# Patient Record
Sex: FEMALE | Race: WHITE
[De-identification: names, ages, dates, MRNs, and addresses within clinical notes are randomized per-mention and may not be internally consistent; named-entity substitution may affect disease eponyms.]

---

## 2022-01-09 ENCOUNTER — HOSPITAL ENCOUNTER (EMERGENCY)
Dept: HOSPITAL 75 - ER FS | Age: 32
Discharge: HOME | End: 2022-01-09
Payer: COMMERCIAL

## 2022-01-09 VITALS — SYSTOLIC BLOOD PRESSURE: 95 MMHG | DIASTOLIC BLOOD PRESSURE: 68 MMHG

## 2022-01-09 VITALS — BODY MASS INDEX: 41.15 KG/M2 | WEIGHT: 238.1 LBS | HEIGHT: 63.78 IN

## 2022-01-09 DIAGNOSIS — R10.13: Primary | ICD-10-CM

## 2022-01-09 DIAGNOSIS — E66.9: ICD-10-CM

## 2022-01-09 DIAGNOSIS — E86.0: ICD-10-CM

## 2022-01-09 DIAGNOSIS — R82.71: ICD-10-CM

## 2022-01-09 DIAGNOSIS — R19.7: ICD-10-CM

## 2022-01-09 LAB
ALBUMIN SERPL-MCNC: 4.2 GM/DL (ref 3.2–4.5)
ALP SERPL-CCNC: 92 U/L (ref 40–136)
ALT SERPL-CCNC: 8 U/L (ref 0–55)
APTT PPP: YELLOW S
BACTERIA #/AREA URNS HPF: (no result) /HPF
BASOPHILS # BLD AUTO: 0 10^3/UL (ref 0–0.1)
BASOPHILS NFR BLD AUTO: 0 % (ref 0–10)
BILIRUB SERPL-MCNC: 0.3 MG/DL (ref 0.1–1)
BILIRUB UR QL STRIP: NEGATIVE
BUN/CREAT SERPL: 18
CALCIUM SERPL-MCNC: 9.3 MG/DL (ref 8.5–10.1)
CHLORIDE SERPL-SCNC: 104 MMOL/L (ref 98–107)
CO2 SERPL-SCNC: 22 MMOL/L (ref 21–32)
CREAT SERPL-MCNC: 1.17 MG/DL (ref 0.6–1.3)
EOSINOPHIL # BLD AUTO: 0.2 10^3/UL (ref 0–0.3)
EOSINOPHIL NFR BLD AUTO: 1 % (ref 0–10)
EOSINOPHIL NFR BLD MANUAL: 3 %
FIBRINOGEN PPP-MCNC: (no result) MG/DL
GFR SERPLBLD BASED ON 1.73 SQ M-ARVRAT: 53 ML/MIN
GLUCOSE SERPL-MCNC: 228 MG/DL (ref 70–105)
GLUCOSE UR STRIP-MCNC: NEGATIVE MG/DL
GRAN CASTS #/AREA URNS LPF: (no result) /LPF
HCT VFR BLD CALC: 52 % (ref 35–52)
HGB BLD-MCNC: 17.7 G/DL (ref 11.5–16)
HYALINE CASTS #/AREA URNS LPF: (no result) /LPF
KETONES UR QL STRIP: NEGATIVE
LEUKOCYTE ESTERASE UR QL STRIP: NEGATIVE
LIPASE SERPL-CCNC: 25 U/L (ref 8–78)
LYMPHOCYTES # BLD AUTO: 3.5 X 10^3 (ref 1–4)
LYMPHOCYTES NFR BLD AUTO: 18 % (ref 12–44)
MANUAL DIFFERENTIAL PERFORMED BLD QL: YES
MCH RBC QN AUTO: 29 PG (ref 25–34)
MCHC RBC AUTO-ENTMCNC: 34 G/DL (ref 32–36)
MCV RBC AUTO: 85 FL (ref 80–99)
MONOCYTES # BLD AUTO: 0.6 X 10^3 (ref 0–1)
MONOCYTES NFR BLD AUTO: 3 % (ref 0–12)
MONOCYTES NFR BLD: 2 %
NEUTROPHILS # BLD AUTO: 15.3 X 10^3 (ref 1.8–7.8)
NEUTROPHILS NFR BLD AUTO: 78 % (ref 42–75)
NEUTS BAND NFR BLD MANUAL: 77 %
NITRITE UR QL STRIP: NEGATIVE
PH UR STRIP: 5.5 [PH] (ref 5–9)
PLATELET # BLD: 521 10^3/UL (ref 130–400)
PMV BLD AUTO: 8.8 FL (ref 9–12.2)
POTASSIUM SERPL-SCNC: 3.8 MMOL/L (ref 3.6–5)
PROT SERPL-MCNC: 7 GM/DL (ref 6.4–8.2)
PROT UR QL STRIP: (no result)
RBC #/AREA URNS HPF: >100 /HPF
SODIUM SERPL-SCNC: 142 MMOL/L (ref 135–145)
SP GR UR STRIP: >=1.03 (ref 1.02–1.02)
SQUAMOUS #/AREA URNS HPF: (no result) /HPF
TOXIC GRANULES BLD QL SMEAR: (no result)
VARIANT LYMPHS NFR BLD MANUAL: 18 %
WBC # BLD AUTO: 19.7 10^3/UL (ref 4.3–11)
WBC #/AREA URNS HPF: (no result) /HPF

## 2022-01-09 PROCEDURE — 80053 COMPREHEN METABOLIC PANEL: CPT

## 2022-01-09 PROCEDURE — 36415 COLL VENOUS BLD VENIPUNCTURE: CPT

## 2022-01-09 PROCEDURE — 87088 URINE BACTERIA CULTURE: CPT

## 2022-01-09 PROCEDURE — 85027 COMPLETE CBC AUTOMATED: CPT

## 2022-01-09 PROCEDURE — 74176 CT ABD & PELVIS W/O CONTRAST: CPT

## 2022-01-09 PROCEDURE — 81000 URINALYSIS NONAUTO W/SCOPE: CPT

## 2022-01-09 PROCEDURE — 84703 CHORIONIC GONADOTROPIN ASSAY: CPT

## 2022-01-09 PROCEDURE — 83690 ASSAY OF LIPASE: CPT

## 2022-01-09 PROCEDURE — 85007 BL SMEAR W/DIFF WBC COUNT: CPT

## 2022-01-09 NOTE — XMS REPORT
Encounter Summary

                             Created on: 2022



Vane Daniel

External Reference #: [id number]

: 1990

Sex: Female



Demographics





                          Address                   [address] [place] [address]  [address]

 

                          Home Phone                [phone]

 

                          Preferred Language        English

 

                          Marital Status            

 

                          Anabaptist Affiliation     [id number]

 

                          Race                      White

 

                          Ethnic Group              Not  or 





Author





                          Author                    [facility]

 

                          Organization              [facility]

 

                          Address                   Unknown

 

                          Phone                     Unavailable







Support





                Name            Relationship    Address         Phone

 

                    Soham Daniel     ECON                [address] [place] [address]  [address]                      [phone]







Care Team Providers





                    Care Team Member Name Role                Phone

 

                    Tito Junior MD    PCP                 [phone]







Reason for Visit

* 



  



                     Reason              Onset Date          Comments

 

  



                           Medication Refill         2021 









Encounter Details





                          Care Team                 Description



                     Date                Type                Department  

 

                                        



Jahaira Canas, LORI



[address]



[place] [address]



[phone] (Work)



[phone] (Fax)                      Medication Refill



                     2021          Refill              [facility]

c  



                                         Specialists  



                                         [address] [facility]  



                                         [address]  



                                         [place] [address] [phone]  







Social History





                                        Date



                 Tobacco Use     Types           Packs/Day       Years Used 

 

                                         



                                         Never Smoker    

 

    



                                         Smokeless Tobacco: Never   



                                         Used   







                                        Comments



                           Alcohol Use               Standard Drinks/Week 

 

                                        social



                           Yes                       0 (1 standard drink = 0.6 o

z pure alcohol) 







  



                     Alcohol Habits      Answer              Date Recorded

 

  



                           How often do you have a drink containing alcohol?  No

t asked 

 

  



                           How many drinks containing alcohol do you have on  No

t asked 



                                         a typical day when you are drinking?  

 

  



                           How often do you have six or more drinks on one  Not 

asked 



                                         occasion?  

 

  



                     Comment:            social              2020







 



                           Sex Assigned at Birth     Date Recorded

 

 



                                         Not on file 



documented as of this encounter



Plan of Treatment





                          Care Team                 Description



                     Date                Type                Specialty  

 

                                        



Salazar Leos MD



[address]



[address]



[place] [address] [phone] (Work)



[phone] (Fax)                       



                     2022          Office Visit        Orthopedic Surgery 

 



documented as of this encounter



Visit Diagnoses









                                         Diagnosis

 





                                         Post-op pain - Primary



                                         Other acute postoperative pain



documented in this encounter



Care Teams





                          Start Date                End Date



                     Team Member         Relationship        Specialty  

 

                          10/24/17                   



                     Tito Junior MD   PCP - General       Family  



                           [phone] (Work)       Medicine  



                                         [phone] (Fax)    



documented as of this encounter

## 2022-01-09 NOTE — DIAGNOSTIC IMAGING REPORT
PROCEDURE: CT abdomen and pelvis without contrast.



TECHNIQUE: Multiple contiguous axial images were obtained through

the abdomen and pelvis without the use of intravenous contrast.

Auto Exposure Controls were utilized during the CT exam to meet

ALARA standards for radiation dose reduction. 



INDICATION:  Abdominal pain. Nausea.



COMPARISON:  None.

 

FINDINGS:  

The heart is unremarkable. The lung bases are clear.



There is atrophy of the left kidney. The right kidney is

unremarkable without evidence of hydronephrosis or renal calculi.

No perinephric fat stranding is seen. The urinary bladder is

decompressed.



The liver, spleen, pancreas, and adrenal glands have a normal

appearance. The gallbladder is unremarkable. There is no

pathologically enlarged mesenteric or retroperitoneal adenopathy.





The bowel loops are nondilated. The appendix is visualized in the

right lower quadrant and has a normal appearance. There is no

free fluid or free air. 



No acute osseous abnormalities.



There is no free air, loculated collection, or adenopathy in the

pelvis. 



IMPRESSION: 

1. No acute abnormalities in the abdomen and pelvis.

2. Chronic atrophy of the left kidney. The right kidney has a

normal appearance.



Dictated by: 



  Dictated on workstation # [id number]

## 2022-01-09 NOTE — XMS REPORT
Encounter Summary

                             Created on: 2022



Vane Daniel

External Reference #: [id number]

: 1990

Sex: Female



Demographics





                          Address                   [address] [place]  [address]

 

                          Home Phone                [phone]

 

                          Preferred Language        English

 

                          Marital Status            

 

                          Hinduism Affiliation     [id number]

 

                          Race                      White

 

                          Ethnic Group              Not  or 





Author





                          Author                    [facility]

 

                          Organization              [facility]

 

                          Address                   Unknown

 

                          Phone                     Unavailable







Support





                Name            Relationship    Address         Phone

 

                    Soham Daniel     ECON                [address] [place]  [address]                      [phone]







Care Team Providers





                    Care Team Member Name Role                Phone

 

                    Tito Junior MD    PCP                 [phone]







Encounter Details





                          Care Team                 Description



                     Date                Type                Department  

 

                                        



Salazar Leos MD



[address]



[address]



[place] [address] [phone] (Work)



[phone] (Fax)                       



                     2021          Telephone           [facility]

c  



                                         Specialists  



                                         [address] [facility]  



                                         [address]  



                                         [place] [address] [phone]  







Social History





                                        Date



                 Tobacco Use     Types           Packs/Day       Years Used 

 

                                         



                                         Never Smoker    

 

    



                                         Smokeless Tobacco: Never   



                                         Used   







                                        Comments



                           Alcohol Use               Standard Drinks/Week 

 

                                        social



                           Yes                       0 (1 standard drink = 0.6 o

z pure alcohol) 







  



                     Alcohol Habits      Answer              Date Recorded

 

  



                           How often do you have a drink containing alcohol?  No

t asked 

 

  



                           How many drinks containing alcohol do you have on  No

t asked 



                                         a typical day when you are drinking?  

 

  



                           How often do you have six or more drinks on one  Not 

asked 



                                         occasion?  

 

  



                     Comment:            social              2020







 



                           Sex Assigned at Birth     Date Recorded

 

 



                                         Not on file 



documented as of this encounter



Miscellaneous Notes

* Telephone Encounter - Yaw Gaitan, AT - 2021 11:18 AM CST



Formatting of this note might be different from the original.

I called and spoke with the patient about her post-op splint. She stated that as
her swelling has decreased her splint has become loose and is rubbing over her 
incisions. I recommended that she put ice over the areas of discomfort to reduce
immediate pain, and to tighten the ace wraps around the splint to see if that d
ecreases any friction around her incisions. I explained that she should not therese
ve any of the cotton wrap, or the plaster part of the splint. She was understand
ing of this and appreciated the return call. 



Electronically signed by Yaw Gaitan, SANTIAGO at 2021 11:21 AM CST

documented in this encounter



Plan of Treatment





                          Care Team                 Description



                     Date                Type                Specialty  

 

                                        



Salazar Leos MD



[address]



[place] [address]



[phone] (Work)



[phone] (Fax)                       



                     2022          Office Visit        Orthopedic Surgery 

 



documented as of this encounter



Visit Diagnoses

Not on filedocumented in this encounter



Care Teams





                          Start Date                End Date



                     Team Member         Relationship        Specialty  

 

                          10/24/17                   



                     Tito Junior MD   PCP - General       Family  



                           [phone] (Work)       Medicine  



                                         [phone] (Fax)    



documented as of this encounter

## 2022-01-09 NOTE — XMS REPORT
Encounter Summary

                             Created on: 2022



Vane Daniel

External Reference #: [id number]

: 1990

Sex: Female



Demographics





                          Address                   [address] [place] [address]  [address]

 

                          Home Phone                [phone]

 

                          Preferred Language        English

 

                          Marital Status            

 

                          Taoist Affiliation     [id number]

 

                          Race                      White

 

                          Ethnic Group              Not  or 





Author





                          Author                    [facility]

 

                          Organization              [facility]

 

                          Address                   Unknown

 

                          Phone                     Unavailable







Support





                Name            Relationship    Address         Phone

 

                    Soham Daniel     ECON                [address] [place] [address]  [address]                      [phone]







Care Team Providers





                    Care Team Member Name Role                Phone

 

                    Tito Junior MD    PCP                 [phone]







Reason for Visit

* Surgical (Routine) - Closed



                    Diagnoses / Procedures Referred By Contact Referred To Conta

ct



                                         Specialty   

 

                                        



Diagnoses



Osteoarthritis of right ankle and foot





Procedures



Referral to Outside Surgery



FL ANKLE SCOPE,PART SYNOVECTOMY



FL ANKLE SCOPE,PART DEBRIDEMENT



FL HALLUX RIGIDUS W/CHEILECTOMY 1ST MP JT W/O IMPLT



FL BONE BIOPSY,TROCAR/NEEDLE SUPERF



FL REMV TALUS/HEEL BENIGN BONE LESN



FL GASTROCNEMIUS RECESSION              



Salazar Leos MD



[address]



[address]



[place] [address]



Phone: [phone]



Fax: [phone]                       



[facility] [facility]



[address] [place] [address]



Fax: [phone]







      



           Referral ID  Status    Reason    Start Date  Expiration  Visits    Vi

sits



                     Date                Requested           Authorized

 

      



            [id number]    Closed     10/26/2021  2022  1          1











Encounter Details





                          Care Team                 Description



                     Date                Type                Department  

 

                                        



Salazar Leos MD



[address]



[address]



[place] [address] [phone] (Work)



[phone] (Fax)                       



                     2021          Outside Surgery     [facility]

c  



                                         Specialists  



                                         [address] [facility]  



                                         [address]  



                                         [place] [address] [phone]  







Social History





                                        Date



                 Tobacco Use     Types           Packs/Day       Years Used 

 

                                         



                                         Never Smoker    

 

    



                                         Smokeless Tobacco: Never   



                                         Used   







                                        Comments



                           Alcohol Use               Standard Drinks/Week 

 

                                        social



                           Yes                       0 (1 standard drink = 0.6 o

z pure alcohol) 







  



                     Alcohol Habits      Answer              Date Recorded

 

  



                           How often do you have a drink containing alcohol?  No

t asked 

 

  



                           How many drinks containing alcohol do you have on  No

t asked 



                                         a typical day when you are drinking?  

 

  



                           How often do you have six or more drinks on one  Not 

asked 



                                         occasion?  

 

  



                     Comment:            social              2020







 



                           Sex Assigned at Birth     Date Recorded

 

 



                                         Not on file 



documented as of this encounter



Plan of Treatment





                          Care Team                 Description



                     Date                Type                Specialty  

 

                                        



Salazar Leos MD



[address]



[place] [address]



[phone] (Work)



[phone] (Fax)                       



                     2022          Office Visit        Orthopedic Surgery 

 



documented as of this encounter



Visit Diagnoses

Not on filedocumented in this encounter



Care Teams





                          Start Date                End Date



                     Team Member         Relationship        Specialty  

 

                          10/24/17                   



                     Tito Junior MD   PCP - General       Family  



                           [phone] (Work)       Medicine  



                                         [phone] (Fax)    



documented as of this encounter

## 2022-01-09 NOTE — XMS REPORT
Encounter Summary

                             Created on: 2022



Vane Daniel

External Reference #: [id number]

: 1990

Sex: Female



Demographics





                          Address                   [address] [place]  [address]

 

                          Home Phone                [phone]

 

                          Preferred Language        English

 

                          Marital Status            

 

                          Yazdanism Affiliation     [id number]

 

                          Race                      White

 

                          Ethnic Group              Not  or 





Author





                          Author                    [facility]

 

                          Organization              [facility]

 

                          Address                   Unknown

 

                          Phone                     Unavailable







Support





                Name            Relationship    Address         Phone

 

                    Soham Daniel     ECON                [address] [place] [address]  [address]                      [phone]







Care Team Providers





                    Care Team Member Name Role                Phone

 

                    Tito Junior MD    PCP                 [phone]







Reason for Visit

* 



  



                     Reason              Onset Date          Comments

 

  



                           Medication Refill         2022 









Encounter Details





                          Care Team                 Description



                     Date                Type                Department  

 

                                        



Jen King PA-C



[address] [facility] [facility]



[address]



[place] [address] [phone] (Work)



[phone] (Fax)                      Medication Refill



                     2022          Refill              [facility]  



                                         Specialists  



                                         [address] [facility]  



                                         [address]  



                                         [place] [address] [phone]  







Social History





                                        Date



                 Tobacco Use     Types           Packs/Day       Years Used 

 

                                         



                                         Never Smoker    

 

    



                                         Smokeless Tobacco: Never   



                                         Used   







                                        Comments



                           Alcohol Use               Standard Drinks/Week 

 

                                        social



                           Yes                       0 (1 standard drink = 0.6 o

z pure alcohol) 







  



                     Alcohol Habits      Answer              Date Recorded

 

  



                           How often do you have a drink containing alcohol?  No

t asked 

 

  



                           How many drinks containing alcohol do you have on  No

t asked 



                                         a typical day when you are drinking?  

 

  



                           How often do you have six or more drinks on one  Not 

asked 



                                         occasion?  

 

  



                     Comment:            social              2018







 



                           Sex Assigned at Birth     Date Recorded

 

 



                                         Not on file 



documented as of this encounter



Miscellaneous Notes

* Telephone Encounter - Jen King PA-C - 2022 12:42 PM CST



Formatting of this note might be different from the original.

Requested refill today at appointment.



Electronically signed by Jen King PA-C at 2022 12:42 PM CST

documented in this encounter



Plan of Treatment





                          Care Team                 Description



                     Date                Type                Specialty  

 

                                        



Salazar Leos MD



[address] [facility] [facility]



[address]



[place] [address] [phone] (Work)



[phone] (Fax)                       



                     2022          Office Visit        Orthopedic Surgery 

 



documented as of this encounter



Visit Diagnoses









                                         Diagnosis

 





                                         Post-op pain



                                         Other acute postoperative pain



documented in this encounter



Care Teams





                          Start Date                End Date



                     Team Member         Relationship        Specialty  

 

                          10/24/17                   



                     Tito Junior MD   PCP - General       Family  



                           [phone] (Work)       Medicine  



                                         [phone] (Fax)    



documented as of this encounter

## 2022-01-09 NOTE — XMS REPORT
Encounter Summary

                             Created on: 2022



Vane Daniel

External Reference #: [id number]

: 1990

Sex: Female



Demographics





                          Address                   [address] [place]  [address]

 

                          Home Phone                [phone]

 

                          Preferred Language        English

 

                          Marital Status            

 

                          Yazidism Affiliation     [id number]

 

                          Race                      White

 

                          Ethnic Group              Not  or 





Author





                          Author                    [facility]

 

                          Organization              [facility]

 

                          Address                   Unknown

 

                          Phone                     Unavailable







Support





                Name            Relationship    Address         Phone

 

                    Soham Daniel     [employer]                [address] [place] [address]  [address]                      [phone]







Care Team Providers





                    Care Team Member Name Role                Phone

 

                    Tito Junior MD    PCP                 [phone]







Reason for Referral

* Physical Therapy (Routine) - Closed



                    Diagnoses / Procedures Referred By Contact Referred To Conta

ct



                                         Specialty   

 

                                        



Diagnoses



Post-op pain

                                        



Jen King PA-C



[address] [facility]



[address]



[place] [address]



Phone: [phone]



Fax: [phone]                       







                                         Physical Therapy   







      



           Referral ID  Status    Reason    Start Date  Expiration  Visits    Vi

sits



                     Date                Requested           Authorized

 

      



           [id number]   Closed    Specialty Services  2022  1        

 1



                                         Required    









Electronically signed by Jen King PA-C at 2022 11:53 AM CST





Reason for Visit

* 



 



                           Reason                    Comments

 

 



                                         Post Op Exam 









Encounter Details





                          Care Team                 Description



                     Date                Type                Department  

 

                                        



Jen King PA-C



[address] [facility]



[address]



[place] [address] [phone] (Work)



[phone] (Fax)                      Post-op pain (Primary Dx)



                     2022          Office Visit        Kaci Miller

c  



                                         Specialists  



                                         [address]  



                                         [address]  



                                         [place] [address] [phone]  







Social History





                                        Date



                 Tobacco Use     Types           Packs/Day       Years Used 

 

                                         



                                         Never Smoker    

 

    



                                         Smokeless Tobacco: Never   



                                         Used   







                                        Comments



                           Alcohol Use               Standard Drinks/Week 

 

                                        social



                           Yes                       0 (1 standard drink = 0.6 o

z pure alcohol) 







  



                     Alcohol Habits      Answer              Date Recorded

 

  



                           How often do you have a drink containing alcohol?  No

t asked 

 

  



                           How many drinks containing alcohol do you have on  No

t asked 



                                         a typical day when you are drinking?  

 

  



                           How often do you have six or more drinks on one  Not 

asked 



                                         occasion?  

 

  



                     Comment:            social              2020







 



                           Sex Assigned at Birth     Date Recorded

 

 



                                         Not on file 



documented as of this encounter



Last Filed Vital Signs





                    Reading             Time Taken          Comments



                                         Vital Sign   

 

                    -                   -                    



                                         Blood Pressure   

 

                    -                   -                    



                                         Pulse   

 

                    36.3 C (97.3 F) 2022 11:34 AM CST  



                                         Temperature   

 

                    -                   -                    



                                         Respiratory Rate   

 

                    -                   -                    



                                         Oxygen Saturation   

 

                    -                   -                    



                                         Inhaled Oxygen   



                                         Concentration   

 

                    111.6 kg (246 lb)   2022 11:34 AM CST  



                                         Weight   

 

                    162.6 cm (5' 4")    2022 11:34 AM CST  



                                         Height   

 

                    42.23               2022 11:34 AM CST  



                                         Body Mass Index   



documented in this encounter



Progress Notes

* Jen King PA-C - 2022 11:00 AM CST



Formatting of this note is different from the original.

Name:    Vane Daniel

:    1990

Primary Care Provider: Tito Junior MD

Appointment Type:  Post-Op 



Chief Complaint 

Patient presents with 

 Right Ankle - Post Op Exam 



We are now 15 days from surgery: right ankle arthroscopy, ankle cheilectomy jeferson
r biopsy with arianne on 2021.



HPI: Patient presents to the office today for a post-op visit. Since surgery, sh
dulce has remained NWB on the RLE with use of knee scooter.  For DVT ppx, Xarelto ha
s been continued. She reports pain has been controlled, but notes she is out of 
pain medications.



Vital Signs: Temp 36.3 C (97.3 F) (Tympanic)  | Ht 1.626 m (5' 4")  | Wt 111
.6 kg (246 lb)  | BMI 42.23 kg/m 



Physical Exam:

Skin:  Visible skin is warm and dry without rashes. Incisions x 4 to the RLE (2x
anterior ankle, 1x lateral ankle, 1x calf)) are approximated with nylon suture 
and staples intact. No erythema, warmth purulent drainage, or s/s of infection n
oted. 

Ext: Neurovascular exam normal, Calf-non-tender bilaterally.  Moderate soft-tiss
ue swelling to the right foot and ankle

MSK Exam - right lower extremity: Compartment of the leg is soft, non-tender, an
d compressible; no signs of compartment syndrome; DP pulse 2+ with brisk cap ref
ill to exposed toes; Sensation intact; patient able to wiggle toes without probl
em; able to PF and DF ankle with minimal complaints of pain



Pathology: Reviewed. Benign bone. No evidence of osteonecrosis of talus.



Impression: 31 y.o. female s/p right ankle arthroscopy, ankle cheilectomy talar 
biopsy with arianne doing well



Plan: Every other suture removed today. Discussed every other day dressing kan
es, with antibiotic cream, 4x4 gauze, and ACE wrap. Instructed to keep incisions
dry.  Pt was instructed to start to be PWB on the RLE in boot and advance with 
therapy. PT order written today. Encouraged ice and elevation of right extremity
. Instructed to continue Xarelto for dvt ppx. Instructed to continue other presc
ribed medications, including tizanidine (refilled) and gabapentin (refilled). Re
fill on hydrocodone sent to Dr. Leos. Work note written for sit-down duties. Re
commend working with therapy before she starts to drive.At next visit, anticipat
e removal of remaining suture and continuing to advance WB status. Return in abo
ut 2 weeks (around 2022) for post-op #2 with Dr. Salazar Leos. 



DME: Ms. Kiser requires boot due to recent surgery. The boot will aid in ambul
ation, help incisions to heal, and help to decrease pain.



Encounter Diagnosis:

  SNOMED CT(R)  

1. Post-op pain  POSTOPERATIVE PAIN Ambulatory referral to Physical Therapy 

  Ankle Walker / Achilles Boot  



RAMON Sahu PA-C

Physician Assistant

[facility]







Electronically signed by Jen King PA-C at 2022 12:41 PM CST

documented in this encounter



Plan of Treatment





                          Care Team                 Description



                     Date                Type                Specialty  

 

                                        



Salazar Leos MD



[address]



[address]



[place] [address]



[phone] (Work)



[phone] (Fax)                       



                     2022          Office Visit        Orthopedic Surgery 

 







                                        Order Schedule



                 Name            Type            Priority        Associated Diag

noses 

 

                                        1 Occurrences starting 2022 until 

2022



                 Ambulatory referral to  Outpatient      Routine         Post-op

 pain 



                           Physical Therapy          Referral   



documented as of this encounter



Procedures





                                        Comments



                 Procedure Name  Priority        Date/Time       Associated Diag

nosis 

 

                                         



                 WALKER BOOT TALL OR SHORT  Routine         2022      Post

-op pain 



                                         12:33 PM CST  



documented in this encounter



Visit Diagnoses









                                         Diagnosis

 





                                         Post-op pain - Primary



                                         Other acute postoperative pain



documented in this encounter



Care Teams





                          Start Date                End Date



                     Team Member         Relationship        Specialty  

 

                          10/24/17                   



                     Tito Junior MD   PCP - General       Family  



                           [phone] (Work)       Medicine  



                                         [phone] (Fax)    



documented as of this encounter

## 2022-01-09 NOTE — XMS REPORT
Clinical Summary

                             Created on: 2022



Vane Daniel

External Reference #: [id number]

: 1990

Sex: Female



Demographics





                          Address                   [address] [place]  [address]

 

                          Home Phone                [phone]

 

                          Preferred Language        English

 

                          Marital Status            

 

                          Restorationist Affiliation     [id number]

 

                          Race                      White

 

                          Ethnic Group              Not  or 





Author





                          Author                    [facility]

 

                          Organization              [facility]

 

                          Address                   Unknown

 

                          Phone                     Unavailable







Support





                Name            Relationship    Address         Phone

 

                    Soham Daniel     [employer]                [address] [place]  [address]                      [phone]







Care Team Providers





                    Care Team Member Name Role                Phone

 

                    Tito Junior MD    PCP                 [phone]







Allergies





                                        Comments



                 Active Allergy  Reactions       Severity        Noted Date 

 

                                        



Other reaction(s): rash, hives



                     Amoxicillin-Pot     Not specified       2013 



                                         Clavulanate    

 

                                         



                     Cefaclor            Hives               2013 

 

                                         



                     Cefpodoxime         Hives               2013 

 

                                        



Other reaction(s): rash, hives



                           Cephalexin                2020 

 

                                         



                     Chlorothiazide      Hives               2013 

 

                                         



                 Sulfa (Sulfonamide  Diarrhea,       High            2012 



                           Antibiotics)              Hives   

 

                                        



Other reaction(s): rash, hives



                     Sulfamethoxazole-Trimetho  Hives               2013 



                                         prim    







Medications





                          End Date                  Status



              Medication   Sig          Dispensed    Refills      Start  



                                         Date  

 

                                                    Active



              levothyroxine (SYNTHROID,  TAKE 1 TABLET  90 tablet    1          

  10/29/201  



                     LEVOTHROID) 75 MCG tablet  BY MOUTH            9  



                                         DAILY     

 

                                                    Active



              sertraline (ZOLOFT) 50 mg  T1TD         30 tablet    1            

  



                           tablet                    0  

 

                                                    Active



              docusate sodium (COLACE)  Take 1       60 capsule   0            1

  



                     100 MG              capsule (100        1  



                           capsuleIndications:       mg total) by     



                           constipation              mouth 2 (two)     



                                         times a day.     



                                         to prevent     



                                         constipation     

 

                                                    Active



              meloxicam (MOBIC) 7.5 MG  Take 1 tablet  30 tablet    0           

   



                     tablet              (7.5 mg             1  



                                         total) by     



                                         mouth daily.     



                                         to help with     



                                         pain and     



                                         swelling     



                                         after surgery     

 

                                                    Active



              ergocalciferol (VITAMIN  Take 1       4 capsule    0              



                     D2) 1,250 mcg (50,000  capsule             1  



                           unit) capsuleIndications:  (50,000 Units     



                           vitamin D deficiency      total) by     



                                         mouth weekly.     

 

                                                    Active



              ascorbic acid (VITAMIN C)  Take 1 tablet  60 tablet    0          

    



                     500 mg tablet       (500 mg             1  



                                         total) by     



                                         mouth daily.     

 

                                                    Active



              metoclopramide (REGLAN)  Take 1 tablet  40 tablet    0            

  



                     10 MG tablet        (10 mg total)       1  



                                         by mouth 4     



                                         (four) times     



                                         a day as     



                                         needed (for     



                                         post-operativ     



                                         e nausea).     

 

                                                    Active



              rivaroxaban (XARELTO) 10  Take 1 tablet  30 tablet    0           

   



                     mg tabletIndications:  (10 mg total)       1  



                           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     



                           thrombosis recurrence     daily.     

 

                                                    Active



                     buPROPion (WELLBUTRIN XL)  1 tablet in         0   



                           300 MG XL 24 hr tablet    the morning     

 

                                                    Active



                 fluconazole (DIFLUCAN)  Take 1 tablet   0               /

02  



                     150 MG tablet       by mouth.           1  

 

                                                    Active



                 trazodone (DESYREL) 50 MG  1 tablet at     0                 



                     tablet              bedtime as          1  



                                         needed     

 

                                                    Active



              tiZANidine (ZANAFLEX) 4  Take 1 tablet  40 tablet    0            

  



                     MG tablet           (4 mg total)        2  



                                         by mouth     



                                         every 6 (six)     



                                         hours as     



                                         needed.     

 

                                                    Active



              gabapentin (NEURONTIN)  Take 1       30 capsule   0              



                     100 MG              capsule (100        2  



                           capsuleIndications:       mg total) by     



                           postoperative acute pain  mouth at     



                                         bedtime. to     



                                         help with     



                                         nerve pain     



                                         after surgery     

 

                                                    Active



              HYDROcodone-acetaminophen  Take 1 tablet  40 tablet    0          

    



                     (NORCO) 5-325 mg per  by mouth            2  



                           tabletIndications:        every 4     



                           Post-op pain              (four) hours     



                                         as needed for     



                                         pain. Max     



                                         Daily Dose: 6     



                                         tablets     

 

                          2021                Discontinued (Reorder)



              tiZANidine (ZANAFLEX) 4  Take 1 tablet  40 tablet    0            

10/26/202  



                     MG tablet           (4 mg total)        1  



                                         by mouth     



                                         every 6 (six)     



                                         hours as     



                                         needed.     

 

                          2022                Discontinued (Reorder)



              HYDROcodone-acetaminophen  Take 1-2     40 tablet    0            

  



                     (NORCO) 5-325 mg per  tablets by          1  



                           tabletIndications:        mouth every 4     



                           Post-op pain              (four) hours     



                                         as needed for     



                                         pain. Max     



                                         Daily Dose:     



                                         12 tablets     

 

                          2022                Discontinued (Reorder)



              tiZANidine (ZANAFLEX) 4  Take 1 tablet  40 tablet    0            

  



                     MG tablet           (4 mg total)        1  



                                         by mouth     



                                         every 6 (six)     



                                         hours as     



                                         needed.     

 

                          2022                Discontinued (Reorder)



              gabapentin (NEURONTIN)  Take 1       14 capsule   0              



                     100 MG              capsule (100        1  



                           capsuleIndications:       mg total) by     



                           postoperative acute pain  mouth at     



                                         bedtime. to     



                                         help with     



                                         nerve pain     



                                         after surgery     







Active Problems





 



                           Problem                   Noted Date

 

 



                           Acquired hypothyroidism   04/10/2018

 

 



                           Glomerulonephritis, IgA   2017

 

 



                           Encounter for routine gynecological examination  2014

 

 



                           Depression                2012

 

 



                           Hypothyroidism            2012

 

 



                           IgA nephropathy           2012







Resolved Problems





  



                     Problem             Noted Date          Resolved Date

 

  



                     Pregnancy           2017          04/10/2018







Encounters





                          Care Team                 Description



                     Date                Type                Specialty  

 

                                        



Jen King PA-C            Post-op pain (Primary Dx)



                     2022          Office Visit        Orthopedic Surgery 

 

 

                                        



Jen King PA-C            Medication Refill



                     2022          Refill              Orthopedic Surgery 

 

 

                                        



Salazar Leos MD                          



                     2021          Documentation       Orthopedic Surgery 

 

 

                                        



Salazar Leos MD                          



                     2021          Telephone           Orthopedic Surgery 

 

 

                                        



Salazar Leos MD                          



                     2021          Outside Surgery     Orthopedic Surgery 

 

 

                                        



Salazar Leos MD                          



                     2021          Documentation       Orthopedic Surgery 

 

 

                                        



Jahaira Canas, FNP                Medication Refill



                     2021          Refill              Orthopedic Surgery 

 

 

                                        



Jahaira Canas FNP                post op medications



                     2021          Telephone           Orthopedic Surgery 

 

 

                                        



Salazar Leos MD                         Osteoarthritis of right ankle and foot (

Primary Dx); 

Right ankle pain, unspecified chronicity



                     10/26/2021          Office Visit        Orthopedic Surgery 

 

 

                                        



Salazar Leos MD                          



                     10/25/2021          Telephone           Orthopedic Surgery 

 



from Last 3 Months



Immunizations





  



                     Name                Administration Dates  Next Due

 

  



                           Influenza QIV (IM)        10/11/2019 

 

  



                           Influenza, seasonal,      2013 



                                         injectable, preservatie  



                                         free (IIV3). 15 =  



                                         Influenza TIV  







Social History





                                        Date



                 Tobacco Use     Types           Packs/Day       Years Used 

 

                                         



                                         Never Smoker    

 

    



                                         Smokeless Tobacco: Never   



                                         Used   







                                        Tobacco Cessation: Counseling Given: No









                                        Comments



                           Alcohol Use               Standard Drinks/Week 

 

                                        social



                           Yes                       0 (1 standard drink = 0.6 o

z pure alcohol) 







  



                     Alcohol Habits      Answer              Date Recorded

 

  



                           How often do you have a drink containing alcohol?  No

t asked 

 

  



                           How many drinks containing alcohol do you have on  No

t asked 



                                         a typical day when you are drinking?  

 

  



                           How often do you have six or more drinks on one  Not 

asked 



                                         occasion?  

 

  



                     Comment:            social              2018







 



                           Sex Assigned at Birth     Date Recorded

 

 



                                         Not on file 







Last Filed Vital Signs





                    Reading             Time Taken          Comments



                                         Vital Sign   

 

                    122/78              11/15/2019  1:18 PM CST  



                                         Blood Pressure   

 

                    101                 2021  9:53 AM CDT  



                                         Pulse   

 

                    36.3 C (97.3 F) 2022 11:34 AM CST  



                                         Temperature   

 

                    16                  10/26/2021  3:07 PM CDT  



                                         Respiratory Rate   

 

                    98%                 2021  9:53 AM CDT  



                                         Oxygen Saturation   

 

                    -                   -                    



                                         Inhaled Oxygen   



                                         Concentration   

 

                    111.6 kg (246 lb)   2022 11:34 AM CST  



                                         Weight   

 

                    162.6 cm (5' 4")    2022 11:34 AM CST  



                                         Height   

 

                    42.23               2022 11:34 AM CST  



                                         Body Mass Index   







Plan of Treatment





                          Care Team                 Description



                     Date                Type                Specialty  

 

                                        



Salazar Leos MD



[address]



[address]



[place] [address]



[phone] (Work)



[phone] (Fax)                       



                     2022          Office Visit        Orthopedic Surgery 

 







   



                 Health Maintenance  Due Date        Last Done       Comments

 

   



                           Td/Tdap#                  1990  

 

   



                           COVID-19 Vaccine (1)      1993  

 

   



                     Cervical Cancer Screening  2020 



                           via Pap Smear             (Previously 



                                         Completed at 



                                         Different 



                                         Location) 

 

   



                     Influenza Vaccine (#1)  10/01/2021          10/11/2019, 



                                         2013 

 

   



                     Pneumococcal Vaccine:  Aged Out            No longer eligib

le based on patient's age to



                           Pediatrics (0 to 5 Years)    complete this topic



                                         and At-Risk Patients (6   



                                         to 64 Years)   







Procedures





                                        Comments



                 Procedure Name  Priority        Date/Time       Associated Diag

nosis 

 

                                         



                 WALKER BOOT TALL OR SHORT  Routine         2022      Post

-op pain 



                                         12:33 PM CST  

 

                                        



Results for this procedure are in the results section.



                           LAB SUMMARY               2021  



                                         3:00 PM CST  

 

                                        



Results for this procedure are in the results section.



                 CT ANKLE WO CONTRAST  Routine         10/16/2021      Osteoarth

ritis of right 



                     RIGHT               12:37 PM CDT        ankle and foot 



                                         Right ankle pain, 



                                         unspecified chronicity 



from Last 3 Months



Results

* Ankle Walker / Achilles Boot  (2022 12:33 PM CST)



    



              Component    Value        Ref Range    Performed At  Pathologist



                                         Signature

 

    



                                         Retail Patient    



                                         Pay?    





* LAB SUMMARY (2021  3:00 PM CST)



                                        Narrative

 

                                        



2021  3:00 PM CST



This result has an attachment that is not available.



Ordered by an unspecified provider.





* CT Ankle wo contrast right (10/16/2021 12:37 PM CDT)



    



              Component    Value        Ref Range    Performed At  Pathologist



                                         Signature

 

    



                           [facility]                  MARY 



                                         CONTRAST TYPE    







                                        Modality



                           Anatomical Region         Laterality 

 

                                        Computed Tomography



                                         Ankle  











                                         Specimen

 









                                        Narrative

 

                                        



This result has an attachment that is not available.











   



                 Performing Organization  Address         City/State/ZIP Code  P

tali Number

 

   



                                         [facility]   





from Last 3 Months



Insurance





                                        Type



            Payer      Benefit    Subscriber ID  Effective  Phone      Address 



                           Plan /                    Dates   



                                         Group     

 

                                         



            [facility] OUT OF  [id number]  2020-P  [phone]  [address] 

[address] 



                     [employer]           resent              [id number] 



                           [place] 



                                         [id number] 







     



            Guarantor Name  Account    Relation to  Date of    Phone      Jarochoin

g Address



                     Type                Patient             Birth  

 

     



            Vane Daniel  Personal/F  Self       1990  [phone]  1

[address] [facility]               (Home)              [place] [address]

 

     



            Vane Daniel  Personal/F  Self       1990  [phone]  1

[address]



                     [facility]               (Home)              [place] [address]

 

     



            Vane Daniel  Personal/F  Self       1990  [phone]  1

[address]               (Home)              [place] [address]







Advance Directives





For more information, please contact: [phone]







                          Patient Representative    Explanation



                           Type                      Date Recorded  

 

                                                     



                                         Advance Directives   



                                         and Living Will   

 

                                                     



                                         Power of    

 

                                                     



                                         Health Care   



                                         Directive   







Care Teams





                          Start Date                End Date



                     Team Member         Relationship        Specialty  

 

                          10/24/17                   



                     Tito Junior MD   PCP - General       Family  



                           [phone] (Work)       Medicine  



                                         [phone] (Fax)

## 2022-01-09 NOTE — XMS REPORT
Encounter Summary

                             Created on: 2022



Vane Daniel

External Reference #: [id number]

: 1990

Sex: Female



Demographics





                          Address                   [address] [place]  [address]

 

                          Home Phone                [phone]

 

                          Preferred Language        English

 

                          Marital Status            

 

                          Islam Affiliation     1013

 

                          Race                      White

 

                          Ethnic Group              Not  or 





Author





                          Author                    [facility]

 

                          Organization              [facility]

 

                          Address                   Unknown

 

                          Phone                     Unavailable







Support





                Name            Relationship    Address         Phone

 

                    Soham Daniel     ECON                [address] [place]  [address]                      [phone]







Care Team Providers





                    Care Team Member Name Role                Phone

 

                    Tito Junior MD    PCP                 [phone]







Reason for Visit

* 



  



                     Reason              Onset Date          Comments

 

  



                           post op medications       2021 









Encounter Details





                          Care Team                 Description



                     Date                Type                Department  

 

                                        



Jahaira Canas FNP



[address]



[place] [address] [phone] (Work)



[phone] (Fax)                      post op medications



                     2021          Telephone           [facility]

c  



                                         Specialists  



                                         [address]  



                                         [address]  



                                         [place] [address] [phone]  







Social History





                                        Date



                 Tobacco Use     Types           Packs/Day       Years Used 

 

                                         



                                         Never Smoker    

 

    



                                         Smokeless Tobacco: Never   



                                         Used   







                                        Comments



                           Alcohol Use               Standard Drinks/Week 

 

                                        social



                           Yes                       0 (1 standard drink = 0.6 o

z pure alcohol) 







  



                     Alcohol Habits      Answer              Date Recorded

 

  



                           How often do you have a drink containing alcohol?  No

t asked 

 

  



                           How many drinks containing alcohol do you have on  No

t asked 



                                         a typical day when you are drinking?  

 

  



                           How often do you have six or more drinks on one  Not 

asked 



                                         occasion?  

 

  



                     Comment:            social              2020







 



                           Sex Assigned at Birth     Date Recorded

 

 



                                         Not on file 



documented as of this encounter



Miscellaneous Notes

* Telephone Encounter - LORI Gutierrez - 2021 11:29 AM CST



Formatting of this note might be different from the original.

Patient's post-op medications sent into 

[facility] - [address]

[address] [facility] [address]

Phone: [phone] Fax: [phone]



Called to inform pt medications were sent to pharmacy above. 



Xarelot 10 mg once daily for dvt ppx (BMI 42.23 kg) - sent in



Colace - sent in

Gabapentin - sent in

Meloxicam - sent in

Vitamin D - sent in

Vitamin C - sent in

Reglan - sent in

Tizanidine - refill



Hydrocodone rx will be sent to Dr. Leos to electronically sign in an alternate 
encounter.



Pt expressed understanding and agrees to plan. All questions answered.



Electronically signed by LORI Gutierrez at 2021 11:38 AM CST

documented in this encounter



Plan of Treatment





                          Care Team                 Description



                     Date                Type                Specialty  

 

                                        



Salazar Leos MD



[address]



[address]



[place] [address]



[phone] (Work)



[phone] (Fax)                       



                     2022          Office Visit        Orthopedic Surgery 

 



documented as of this encounter



Visit Diagnoses

Not on filedocumented in this encounter



Care Teams





                          Start Date                End Date



                     Team Member         Relationship        Specialty  

 

                          10/24/17                   



                     Tito Junior MD   PCP - General       Family  



                           [phone] (Work)       Medicine  



                                         [phone] (Fax)    



documented as of this encounter

## 2022-01-09 NOTE — ED GENERAL
General


Chief Complaint:  Abdominal/GI Problems


Stated Complaint:  ABDONINAL PAIN


Nursing Triage Note:  


Pt c/o intermittent abd pain x few days. Pt reports she had bilateral hand 


redness/tingling and breaks out in hives when pain comes. C/o n/d. Pt denies 


urinary symptoms, fever, cough, or SOA.





History of Present Illness


Date Seen by Provider:  2022


Time Seen by Provider:  04:17


Initial Comments


31-year-old female presenting with complaints of intermittent abdominal pain 

over the last few weeks.  She thought that it might be related to an allergy to 

some food or her something.  She had more severe pain last night and was having 

redness and tingling on her arms and hands.  He was developing hives on her 

hands as well.  She denies vomiting but does have nausea and diarrhea when the 

pain comes on.  She denies any pain with urination, fever, chills, cough, 

shortness of breath, sore throat.  She has not had any blood in her stool.  She 

has an appointment to see her regular doctor next week about these recurrent 

symptoms but with her pain being more severe tonight and feeling lightheaded and

dizzy she called EMS to bring her to the ED.  Her primary care doctor is Dr. Tito Junior in [facility] and she uses available for her pharmacies but EMS 

brought her here to [place] where she had not been seen previously


Severity:  Severe


Associated Systoms:  No Chest Pain, No Cough, No Diaphoresis, No Fever/Chills, 

No Headaches, No Loss of Appetite, No Malaise; Nausea/Vomiting (nausea but no 

vomiting), Rash (redness on her arms and hands); No Seizure, No Shortness of 

Air, No Syncope, No Weakness





Allergies and Home Medications


Allergies


Coded Allergies:  


     amoxicillin (Verified  Allergy, Unknown, 22)


     cefaclor (Verified  Allergy, Unknown, 22)


     chlorothiazide (Verified  Allergy, Unknown, 22)


     clavulanic acid (Verified  Allergy, Unknown, 22)


     sulfamethoxazole (Verified  Allergy, Unknown, 22)


     trimethoprim (Verified  Allergy, Unknown, 22)





Patient Home Medication List


Home Medication List Reviewed:  Yes


Dicyclomine HCl (Dicyclomine HCl) 20 Mg Tablet, 20 MG PO Q6H PRN for abdominal 

cramping/pain


   Prescribed by: CURTIS VÁSQUEZ on 22 0619





Review of Systems


Review of Systems


Constitutional:  No chills, No fever


EENTM:  no symptoms reported


Respiratory:  no symptoms reported


Cardiovascular:  no symptoms reported


Gastrointestinal:  see HPI, diarrhea, nausea


Genitourinary:  No dysuria


Pregnant:  No


LMP:  2022


Musculoskeletal:  joint pain (recent ankle surgery)


Skin:  see HPI, change in color, rash


Psychiatric/Neurological:  Anxiety





Past Medical-Social-Family Hx


Patient Social History


Tobacco Use?:  No


Use of E-Cig and/or Vaping dev:  No


Substance use?:  No


Alcohol Use?:  No


Pt feels they are or have been:  No





Immunizations Up To Date


Influenza Vaccine Up-to-Date:  No; Not Current


First/Initial COVID19 Vaccinat:  denies





Past Medical History


Surgeries:  Yes


Orthopedic


Last Menstrual Period:  2022





Physical Exam


Vital Signs





Vital Signs - First Documented








 22





 04:30


 


Temp 36.1


 


Pulse 103


 


Resp 17


 


B/P (MAP) 95/68 (77)


 


Pulse Ox 99


 


O2 Delivery Room Air





Capillary Refill : Less Than 3 Seconds


Height, Weight, BMI


Height: '"


Weight: lbs. oz. kg; 41.00 BMI


Method:


General Appearance:  Anxious, Obese


HEENT:  PERRL/EOMI, Pharynx Normal


Neck:  Full Range of Motion, Normal Inspection, Non Tender, Supple


Respiratory:  Chest Non Tender, Lungs Clear, Normal Breath Sounds, No Accessory 

Muscle Use, No Respiratory Distress


Cardiovascular:  Normal Peripheral Pulses, Tachycardia


Gastrointestinal:  Normal Bowel Sounds, No Pulsatile Mass, Soft, Tenderness 

(epigastric)


Rectal:  Deferred


Extremity:  Normal Capillary Refill


Neurologic/Psychiatric:  Alert, Oriented x3


Skin:  Warm/Dry, Erythema (arms and hands)





Progress/Results/Core Measures


Suspected Sepsis


SIRS


Temperature: 


Pulse: 103 


Respiratory Rate: 17


 


Laboratory Tests


22 04:38: White Blood Count 19.7H


Blood Pressure 95 /68 


Mean: 77


 


Laboratory Tests


22 04:38: 


Creatinine 1.17, Platelet Count 521H, Total Bilirubin 0.3








Results/Orders


Lab Results





Laboratory Tests








Test


 22


04:38 22


05:05 Range/Units


 


 


White Blood Count


 19.7 H


 


 4.3-11.0


10^3/uL


 


Red Blood Count


 6.07 H


 


 3.80-5.11


10^6/uL


 


Hemoglobin 17.7 H  11.5-16.0  g/dL


 


Hematocrit 52   35-52  %


 


Mean Corpuscular Volume 85   80-99  fL


 


Mean Corpuscular Hemoglobin 29   25-34  pg


 


Mean Corpuscular Hemoglobin


Concent 34 


 


 32-36  g/dL





 


Red Cell Distribution Width 13.1   10.0-14.5  %


 


Platelet Count


 521 H


 


 130-400


10^3/uL


 


Mean Platelet Volume 8.8 L  9.0-12.2  fL


 


Neutrophils (%) (Auto) 78 H  42-75  %


 


Lymphocytes (%) (Auto) 18   12-44  %


 


Monocytes (%) (Auto) 3   0-12  %


 


Eosinophils (%) (Auto) 1   0-10  %


 


Basophils (%) (Auto) 0   0-10  %


 


Neutrophils # (Auto) 15.3 H  1.8-7.8  X 10^3


 


Lymphocytes # (Auto) 3.5   1.0-4.0  X 10^3


 


Monocytes # (Auto) 0.6   0.0-1.0  X 10^3


 


Eosinophils # (Auto)


 0.2 


 


 0.0-0.3


10^3/uL


 


Basophils # (Auto)


 0.0 


 


 0.0-0.1


10^3/uL


 


Neutrophils % (Manual) 77    %


 


Lymphocytes % (Manual) 18    %


 


Monocytes % (Manual) 2    %


 


Eosinophils % (Manual) 3    %


 


Toxic Granulation 4+    


 


Sodium Level 142   135-145  MMOL/L


 


Potassium Level 3.8   3.6-5.0  MMOL/L


 


Chloride Level 104     MMOL/L


 


Carbon Dioxide Level 22   21-32  MMOL/L


 


Anion Gap 16 H  5-14  MMOL/L


 


Blood Urea Nitrogen 21 H  7-18  MG/DL


 


Creatinine


 1.17 


 


 0.60-1.30


MG/DL


 


Estimat Glomerular Filtration


Rate 53 


 


  





 


BUN/Creatinine Ratio 18    


 


Glucose Level 228 H    MG/DL


 


Calcium Level 9.3   8.5-10.1  MG/DL


 


Corrected Calcium 9.1   8.5-10.1  MG/DL


 


Total Bilirubin 0.3   0.1-1.0  MG/DL


 


Aspartate Amino Transf


(AST/SGOT) 13 


 


 5-34  U/L





 


Alanine Aminotransferase


(ALT/SGPT) 8 


 


 0-55  U/L





 


Alkaline Phosphatase 92     U/L


 


Total Protein 7.0   6.4-8.2  GM/DL


 


Albumin 4.2   3.2-4.5  GM/DL


 


Lipase 25   8-78  U/L


 


Serum Pregnancy Test,


Qualitative NEGATIVE 


 


 NEGATIVE  





 


Urine Color  YELLOW   


 


Urine Clarity


 


 SLIGHTLY


CLOUDY  





 


Urine pH  5.5  5-9  


 


Urine Specific Gravity  >=1.030  1.016-1.022  


 


Urine Protein  2+ H NEGATIVE  


 


Urine Glucose (UA)  NEGATIVE  NEGATIVE  


 


Urine Ketones  NEGATIVE  NEGATIVE  


 


Urine Nitrite  NEGATIVE  NEGATIVE  


 


Urine Bilirubin  NEGATIVE  NEGATIVE  


 


Urine Urobilinogen  0.2  < = 1.0  MG/DL


 


Urine Leukocyte Esterase  NEGATIVE  NEGATIVE  


 


Urine RBC (Auto)  3+ H NEGATIVE  


 


Urine RBC  >100 H  /HPF


 


Urine WBC  NONE   /HPF


 


Urine Squamous Epithelial


Cells 


 2-5 


  /HPF





 


Urine Crystals  NONE   /LPF


 


Urine Bacteria  MODERATE H  /HPF


 


Urine Casts  PRESENT   /LPF


 


Urine Hyaline Casts  25-50 H  /LPF


 


Urine Granular Casts  5-10 H  /LPF


 


Urine Mucus  LARGE H  /LPF


 


Urine Culture Indicated  YES   








My Orders





Orders - CURTIS VÁSQUEZ MD


Comprehensive Metabolic Panel (22 04:44)


Lipase (22 04:44)


Ua Culture If Indicated (22 04:44)


Ed Iv/Invasive Line Start (22 04:44)


Cbc With Automated Diff (22 04:44)


Ct Abdomen/Pelvis Wo (22 04:44)


Ns Iv 1000 Ml (Sodium Chloride 0.9%) (22 04:44)


Ondansetron Injection (Zofran Injectio (22 04:44)


Pantoprazole Injection (Protonix Injecti (22 04:44)


Dicyclomine Injection (Bentyl Injection) (22 04:44)


Hcg,Qualitative Serum (22 04:44)


Manual Differential (22 04:38)


Urine Culture (22 05:05)


Rx-Dicyclomine Capsule (Rx-Bentyl Capsul (22 06:30)


Hydrocodone/Apap 5/325 Tablet (Lortab 5 (22 06:17)





Vital Signs/I&O











 22





 04:30 06:00


 


Temp 36.1 


 


Pulse 103 99


 


Resp 17 17


 


B/P (MAP) 95/68 (77) 116/78


 


Pulse Ox 99 98


 


O2 Delivery Room Air Room Air





Capillary Refill : Less Than 3 Seconds








Blood Pressure Mean:                    77








Progress Note #1:  


Progress Note


Obtain basic labs with urinalysis.  Order a pregnancy test off of her serum.  CT

scan of the abdomen pelvis without contrast since she reports having kidney 

issues in the past.  Give IV fluids for hydration, Zofran for nausea, Protonix 

for epigastric pain, Bentyl for abdominal cramping


Progress Note #2:  


Progress Note


Labs today show an elevated white blood cell count of 19.7.  Her chemistry panel

has an elevated glucose but otherwise no acute significant abnormality.  

Pregnancy test was negative.  Urinalysis had some blood and bacteria and a 

culture will be obtained.


Progress Note #3:  


Progress Note


CT scan head did not show any acute significant abnormality.  Her symptoms were 

improved with treatment in the ED. Will discuss treating with antibiotic vs 

waiting on urine culture for urinalysis showing bacteria and blood. Have her 

follow a bland diet and check with clinic as scheduled. May need GI referral


Progress Note #4:  


Progress Note


Patient wanted to wait in terms of the bacteria in her urine and see what the 

culture grew out.  She was not having any symptoms of a UTI and its possible 

that the bacteria may just be contamination from the skin rather than a true 

bladder infection.  If the culture and sensitivity shows that she requires an 

antibiotic will give her a call and let her know once that result is back.  

Otherwise patient was feeling better and reviewed results with patient and her 

spouse.  Will discharge on dicyclomine for abdominal pain and cramping.  She 

reports that she does have family members with celiac disease and irritable 

bowel.  She may need to have testing done with a GI doctor or follow-up at least

with her primary doctor.





Diagnostic Imaging





   Diagonstic Imaging:  CT


   Plain Films/CT/US/NM/MRI:  abdomen, pelvis


Comments


NAME:   CLAUDE GREENFIELD


[facility] REC#:   [id number]


ACCOUNT#:   [id number]


PT STATUS:   REG ER


:   1990


PHYSICIAN:   CURTIS VÁSQUEZ MD


ADMIT DATE:   22/ER FS


                                   ***Draft***


Date of Exam:22





CT ABDOMEN/PELVIS WO








PROCEDURE: CT abdomen and pelvis without contrast.





TECHNIQUE: Multiple contiguous axial images were obtained through


the abdomen and pelvis without the use of intravenous contrast.


Auto Exposure Controls were utilized during the CT exam to meet


ALARA standards for radiation dose reduction. 





INDICATION:  Abdominal pain. Nausea.





COMPARISON:  None.


 


FINDINGS:  


The heart is unremarkable. The lung bases are clear.





There is atrophy of the left kidney. The right kidney is


unremarkable without evidence of hydronephrosis or renal calculi.


No perinephric fat stranding is seen. The urinary bladder is


decompressed.





The liver, spleen, pancreas, and adrenal glands have a normal


appearance. The gallbladder is unremarkable. There is no


pathologically enlarged mesenteric or retroperitoneal adenopathy.








The bowel loops are nondilated. The appendix is visualized in the


right lower quadrant and has a normal appearance. There is no


free fluid or free air. 





No acute osseous abnormalities.





There is no free air, loculated collection, or adenopathy in the


pelvis. 





IMPRESSION: 


1. No acute abnormalities in the abdomen and pelvis.


2. Chronic atrophy of the left kidney. The right kidney has a


normal appearance.








  Dictated on workstation # [id number]








Dict:   22 0521


Trans:   22 0524


[facility] [id number]





Interpreted by:     CHRISTY GOLDBERG DO


Electronically signed by:


   Reviewed:  Reviewed by Me





Departure


Impression





   Primary Impression:  


   Epigastric abdominal pain


   Additional Impressions:  


   Diarrhea


   Qualified Codes:  R19.7 - Diarrhea, unspecified


   Dehydration


   Bacteriuria


Disposition:   HOME, SELF-CARE


Condition:  Stable





Departure-Patient Inst.


Decision time for Depature:  06:18


Referrals:  


NO,LOCAL PHYSICIAN (PCP)


Primary Care Physician


Patient Instructions:  Gastritis ED, Dehydration, Adult ED, Diarrhea, Adult ED, 

Abdominal Pain, Adult ED





Add. Discharge Instructions:  


Keep your follow-up with Dr. Junior.





Try following a low fat bland diet.





Stay well hydrated and drink plenty of fluids.


You could try the Dicyclomine (Bentyl) for abdominal pain and nausea if it 

recurs.





All discharge instructions reviewed with patient and/or family. Voiced 

understanding.


Scripts


Dicyclomine HCl (Dicyclomine HCl) 20 Mg Tablet


20 MG PO Q6H PRN for abdominal cramping/pain for 7 Days, #28 TAB 0 Refills


   Prov: CURTIS VÁSQUEZ MD         22











CURTIS VÁSQUEZ MD                2022 05:33

## 2022-01-09 NOTE — XMS REPORT
Encounter Summary

                             Created on: 2022



Vane Daniel

External Reference #: [id number]

: 1990

Sex: Female



Demographics





                          Address                   [address] [place]  [address]

 

                          Home Phone                [phone]

 

                          Preferred Language        English

 

                          Marital Status            

 

                          Anglican Affiliation     1013

 

                          Race                      White

 

                          Ethnic Group              Not  or 





Author





                          Author                    [facility]

 

                          Organization              [facility]

 

                          Address                   Unknown

 

                          Phone                     Unavailable







Support





                Name            Relationship    Address         Phone

 

                    Soham Daniel     EC[employer]                [address] [place]  [address]                      [phone]







Care Team Providers





                    Care Team Member Name Role                Phone

 

                    Tito Junior MD    PCP                 [phone]







Encounter Details





                          Care Team                 Description



                     Date                Type                Department  

 

                                        



Salazar Leos MD



[address] [address]



[address]



[facility] [place], [place] [address] [phone] (Work)



[phone] (Fax)                       



                     2021          Documentation       [facility]

c  



                                         Specialists  



                                         [address]  



                                         [address]  



                                         [facility] [place] [address]  



                                         [phone]  







Social History





                                        Date



                 Tobacco Use     Types           Packs/Day       Years Used 

 

                                         



                                         Never Smoker    

 

    



                                         Smokeless Tobacco: Never   



                                         Used   







                                        Comments



                           Alcohol Use               Standard Drinks/Week 

 

                                        social



                           Yes                       0 (1 standard drink = 0.6 o

z pure alcohol) 







  



                     Alcohol Habits      Answer              Date Recorded

 

  



                           How often do you have a drink containing alcohol?  No

t asked 

 

  



                           How many drinks containing alcohol do you have on  No

t asked 



                                         a typical day when you are drinking?  

 

  



                           How often do you have six or more drinks on one  Not 

asked 



                                         occasion?  

 

  



                     Comment:            social              2020







 



                           Sex Assigned at Birth     Date Recorded

 

 



                                         Not on file 



documented as of this encounter



Plan of Treatment





                          Care Team                 Description



                     Date                Type                Specialty  

 

                                        



Salazar Leos MD



[address] [facility] [address] [facility]



[address]



[facility] [place] [address]



[phone] (Work)



[phone] (Fax)                       



                     2022          Office Visit        Orthopedic Surgery 

 



documented as of this encounter



Visit Diagnoses

Not on filedocumented in this encounter



Care Teams





                          Start Date                End Date



                     Team Member         Relationship        Specialty  

 

                          10/24/17                   



                     Tito Junior MD   PCP - General       Family  



                           [phone] (Work)       Medicine  



                                         [phone] (Fax)    



documented as of this encounter

## 2022-01-12 ENCOUNTER — HOSPITAL ENCOUNTER (EMERGENCY)
Dept: HOSPITAL 75 - ER FS | Age: 32
Discharge: HOME | End: 2022-01-12
Payer: COMMERCIAL

## 2022-01-12 VITALS — WEIGHT: 293 LBS | HEIGHT: 63.78 IN | BODY MASS INDEX: 50.64 KG/M2

## 2022-01-12 VITALS — SYSTOLIC BLOOD PRESSURE: 113 MMHG | DIASTOLIC BLOOD PRESSURE: 83 MMHG

## 2022-01-12 DIAGNOSIS — R10.9: ICD-10-CM

## 2022-01-12 DIAGNOSIS — R73.9: ICD-10-CM

## 2022-01-12 DIAGNOSIS — R55: ICD-10-CM

## 2022-01-12 DIAGNOSIS — L50.9: Primary | ICD-10-CM

## 2022-01-12 DIAGNOSIS — E66.9: ICD-10-CM

## 2022-01-12 DIAGNOSIS — R19.7: ICD-10-CM

## 2022-01-12 DIAGNOSIS — D61.818: ICD-10-CM

## 2022-01-12 LAB
BACTERIA #/AREA URNS HPF: (no result) /HPF
BASOPHILS # BLD AUTO: 0.1 10^3/UL (ref 0–0.1)
BASOPHILS NFR BLD AUTO: 1 % (ref 0–10)
BILIRUB UR QL STRIP: NEGATIVE
BUN/CREAT SERPL: 17
CALCIUM SERPL-MCNC: 8.7 MG/DL (ref 8.5–10.1)
CAOX CRY #/AREA URNS LPF: (no result) /LPF
CHLORIDE SERPL-SCNC: 104 MMOL/L (ref 98–107)
CO2 SERPL-SCNC: 17 MMOL/L (ref 21–32)
CREAT SERPL-MCNC: 1.2 MG/DL (ref 0.6–1.3)
EOSINOPHIL # BLD AUTO: 0.2 10^3/UL (ref 0–0.3)
EOSINOPHIL NFR BLD AUTO: 1 % (ref 0–10)
EOSINOPHIL NFR BLD MANUAL: 1 %
FIBRINOGEN PPP-MCNC: CLEAR MG/DL
GFR SERPLBLD BASED ON 1.73 SQ M-ARVRAT: 52 ML/MIN
GLUCOSE SERPL-MCNC: 313 MG/DL (ref 70–105)
GLUCOSE UR STRIP-MCNC: NEGATIVE MG/DL
HCT VFR BLD CALC: 48 % (ref 35–52)
HGB BLD-MCNC: 16.1 G/DL (ref 11.5–16)
KETONES UR QL STRIP: NEGATIVE
LEUKOCYTE ESTERASE UR QL STRIP: NEGATIVE
LYMPHOCYTES # BLD AUTO: 3.7 X 10^3 (ref 1–4)
LYMPHOCYTES NFR BLD AUTO: 16 % (ref 12–44)
MAGNESIUM SERPL-MCNC: 1.5 MG/DL (ref 1.6–2.4)
MANUAL DIFFERENTIAL PERFORMED BLD QL: YES
MCH RBC QN AUTO: 29 PG (ref 25–34)
MCHC RBC AUTO-ENTMCNC: 34 G/DL (ref 32–36)
MCV RBC AUTO: 87 FL (ref 80–99)
MICROCYTES BLD QL SMEAR: SLIGHT
MONOCYTES # BLD AUTO: 0.6 X 10^3 (ref 0–1)
MONOCYTES NFR BLD AUTO: 3 % (ref 0–12)
MONOCYTES NFR BLD: 5 %
NEUTROPHILS # BLD AUTO: 18.2 X 10^3 (ref 1.8–7.8)
NEUTROPHILS NFR BLD AUTO: 80 % (ref 42–75)
NEUTS BAND NFR BLD MANUAL: 70 %
NEUTS BAND NFR BLD: 6 %
NITRITE UR QL STRIP: NEGATIVE
PH UR STRIP: 5.5 [PH] (ref 5–9)
PLATELET # BLD EST: (no result) 10*3/UL
PLATELET # BLD: 561 10^3/UL (ref 130–400)
PMV BLD AUTO: 9.1 FL (ref 9–12.2)
POTASSIUM SERPL-SCNC: 3.7 MMOL/L (ref 3.6–5)
PROT UR QL STRIP: (no result)
RBC #/AREA URNS HPF: (no result) /HPF
RETICS #: 112 10E9/UL (ref 24–90)
RETICS/RBC NFR: 2.02 % (ref 0.5–2.4)
SODIUM SERPL-SCNC: 138 MMOL/L (ref 135–145)
SP GR UR STRIP: >=1.03 (ref 1.02–1.02)
SQUAMOUS #/AREA URNS HPF: (no result) /HPF
TOXIC GRANULES BLD QL SMEAR: (no result)
VARIANT LYMPHS NFR BLD MANUAL: 9 %
VARIANT LYMPHS NFR BLD MANUAL: 9 %
WBC # BLD AUTO: 22.9 10^3/UL (ref 4.3–11)
WBC #/AREA URNS HPF: (no result) /HPF

## 2022-01-12 PROCEDURE — 83735 ASSAY OF MAGNESIUM: CPT

## 2022-01-12 PROCEDURE — 87088 URINE BACTERIA CULTURE: CPT

## 2022-01-12 PROCEDURE — 85045 AUTOMATED RETICULOCYTE COUNT: CPT

## 2022-01-12 PROCEDURE — 85007 BL SMEAR W/DIFF WBC COUNT: CPT

## 2022-01-12 PROCEDURE — 36415 COLL VENOUS BLD VENIPUNCTURE: CPT

## 2022-01-12 PROCEDURE — 86141 C-REACTIVE PROTEIN HS: CPT

## 2022-01-12 PROCEDURE — 85055 RETICULATED PLATELET ASSAY: CPT

## 2022-01-12 PROCEDURE — 81000 URINALYSIS NONAUTO W/SCOPE: CPT

## 2022-01-12 PROCEDURE — 80048 BASIC METABOLIC PNL TOTAL CA: CPT

## 2022-01-12 PROCEDURE — 85027 COMPLETE CBC AUTOMATED: CPT

## 2022-01-12 NOTE — XMS REPORT
Encounter Summary

                             Created on: 2022



Vane Daniel

External Reference #: [id number]

: 1990

Sex: Female



Demographics





                          Address                   [address] [place]  [address]

 

                          Home Phone                [phone]

 

                          Preferred Language        English

 

                          Marital Status            

 

                          Anabaptist Affiliation     [id number]

 

                          Race                      White

 

                          Ethnic Group              Not  or 





Author





                          Author                    [facility]

 

                          Organization              [facility]

 

                          Address                   Unknown

 

                          Phone                     Unavailable







Support





                Name            Relationship    Address         Phone

 

                    Soham Daniel     ECON                [address] [place] [address]  [address]                      [phone]







Care Team Providers





                    Care Team Member Name Role                Phone

 

                    Tito Junior MD    PCP                 [phone]







Reason for Referral

* Physical Therapy (Routine) - Closed



                    Diagnoses / Procedures Referred By Contact Referred To Conta

ct



                                         Specialty   

 

                                        



Diagnoses



Post-op pain

                                        



Jen King PA-C



[address] [facility]



[address]



[place] [address]



Phone: [phone]



Fax: [phone]                       







                                         Physical Therapy   







      



           Referral ID  Status    Reason    Start Date  Expiration  Visits    Vi

sits



                     Date                Requested           Authorized

 

      



           [id number]   Closed    Specialty Services  2022  1        

 1



                                         Required    









Electronically signed by Jen King PA-C at 2022 11:53 AM CST





Reason for Visit

* 



 



                           Reason                    Comments

 

 



                                         Post Op Exam 









Encounter Details





                          Care Team                 Description



                     Date                Type                Department  

 

                                        



Jne King PA-C



[address] [facility]



[address]



[place] [address] [phone] (Work)



[phone] (Fax)                      Post-op pain (Primary Dx)



                     2022          Office Visit        [facility]

c  



                                         Specialists  



                                         [address] [facility]  



                                         [address]  



                                         [place] [address] [phone]  







Social History





                                        Date



                 Tobacco Use     Types           Packs/Day       Years Used 

 

                                         



                                         Never Smoker    

 

    



                                         Smokeless Tobacco: Never   



                                         Used   







                                        Comments



                           Alcohol Use               Standard Drinks/Week 

 

                                        social



                           Yes                       0 (1 standard drink = 0.6 o

z pure alcohol) 







  



                     Alcohol Habits      Answer              Date Recorded

 

  



                           How often do you have a drink containing alcohol?  No

t asked 

 

  



                           How many drinks containing alcohol do you have on  No

t asked 



                                         a typical day when you are drinking?  

 

  



                           How often do you have six or more drinks on one  Not 

asked 



                                         occasion?  

 

  



                     Comment:            social              2020







 



                           Sex Assigned at Birth     Date Recorded

 

 



                                         Not on file 



documented as of this encounter



Last Filed Vital Signs





                    Reading             Time Taken          Comments



                                         Vital Sign   

 

                    -                   -                    



                                         Blood Pressure   

 

                    -                   -                    



                                         Pulse   

 

                    36.3 C (97.3 F) 2022 11:34 AM CST  



                                         Temperature   

 

                    -                   -                    



                                         Respiratory Rate   

 

                    -                   -                    



                                         Oxygen Saturation   

 

                    -                   -                    



                                         Inhaled Oxygen   



                                         Concentration   

 

                    111.6 kg (246 lb)   2022 11:34 AM CST  



                                         Weight   

 

                    162.6 cm (5' 4")    2022 11:34 AM CST  



                                         Height   

 

                    42.23               2022 11:34 AM CST  



                                         Body Mass Index   



documented in this encounter



Progress Notes

* Jen King PA-C - 2022 11:00 AM CST



Formatting of this note is different from the original.

Name:    Vane Daniel

:    1990

Primary Care Provider: Tito Junior MD

Appointment Type:  Post-Op 



Chief Complaint 

Patient presents with 

 Right Ankle - Post Op Exam 



We are now 15 days from surgery: right ankle arthroscopy, ankle cheilectomy jeferson
r biopsy with arianne on 2021.



HPI: Patient presents to the office today for a post-op visit. Since surgery, sh
dulce has remained NWB on the RLE with use of knee scooter.  For DVT ppx, Xarelto ha
s been continued. She reports pain has been controlled, but notes she is out of 
pain medications.



Vital Signs: Temp 36.3 C (97.3 F) (Tympanic)  | Ht 1.626 m (5' 4")  | Wt 111
.6 kg (246 lb)  | BMI 42.23 kg/m 



Physical Exam:

Skin:  Visible skin is warm and dry without rashes. Incisions x 4 to the RLE (2x
anterior ankle, 1x lateral ankle, 1x calf)) are approximated with nylon suture 
and staples intact. No erythema, warmth purulent drainage, or s/s of infection n
oted. 

Ext: Neurovascular exam normal, Calf-non-tender bilaterally.  Moderate soft-tiss
ue swelling to the right foot and ankle

MSK Exam - right lower extremity: Compartment of the leg is soft, non-tender, an
d compressible; no signs of compartment syndrome; DP pulse 2+ with brisk cap ref
ill to exposed toes; Sensation intact; patient able to wiggle toes without probl
em; able to PF and DF ankle with minimal complaints of pain



Pathology: Reviewed. Benign bone. No evidence of osteonecrosis of talus.



Impression: 31 y.o. female s/p right ankle arthroscopy, ankle cheilectomy talar 
biopsy with arianne doing well



Plan: Every other suture removed today. Discussed every other day dressing kan
es, with antibiotic cream, 4x4 gauze, and ACE wrap. Instructed to keep incisions
dry.  Pt was instructed to start to be PWB on the RLE in boot and advance with 
therapy. PT order written today. Encouraged ice and elevation of right extremity
. Instructed to continue Xarelto for dvt ppx. Instructed to continue other presc
ribed medications, including tizanidine (refilled) and gabapentin (refilled). Re
fill on hydrocodone sent to Dr. Leos. Work note written for sit-down duties. Re
commend working with therapy before she starts to drive.At next visit, anticipat
e removal of remaining suture and continuing to advance WB status. Return in abo
ut 2 weeks (around 2022) for post-op #2 with Dr. Salazar Leos. 



DME: Ms. Kiser requires boot due to recent surgery. The boot will aid in ambul
ation, help incisions to heal, and help to decrease pain.



Encounter Diagnosis:

  SNOMED CT(R)  

1. Post-op pain  POSTOPERATIVE PAIN Ambulatory referral to Physical Therapy 

  Ankle Walker / Achilles Boot  



RAMON Sahu PA-C

Physician Assistant

[facility]







Electronically signed by Jen King PA-C at 2022 12:41 PM CST

documented in this encounter



Plan of Treatment





                          Care Team                 Description



                     Date                Type                Specialty  

 

                                        



Salazar Leos MD



[address]



[address]



[place] [address]



[phone] (Work)



[phone] (Fax)                       



                     2022          Office Visit        Orthopedic Surgery 

 







                                        Order Schedule



                 Name            Type            Priority        Associated Diag

noses 

 

                                        1 Occurrences starting 2022 until 

2022



                 Ambulatory referral to  Outpatient      Routine         Post-op

 pain 



                           Physical Therapy          Referral   



documented as of this encounter



Procedures





                                        Comments



                 Procedure Name  Priority        Date/Time       Associated Diag

nosis 

 

                                         



                 WALKER BOOT TALL OR SHORT  Routine         2022      Post

-op pain 



                                         12:33 PM CST  



documented in this encounter



Visit Diagnoses









                                         Diagnosis

 





                                         Post-op pain - Primary



                                         Other acute postoperative pain



documented in this encounter



Care Teams





                          Start Date                End Date



                     Team Member         Relationship        Specialty  

 

                          10/24/17                   



                     Tito Junior MD   PCP - General       Family  



                           [phone] (Work)       Medicine  



                                         [phone] (Fax)    



documented as of this encounter

## 2022-01-12 NOTE — XMS REPORT
Encounter Summary

                             Created on: 2022



Vane Daniel

External Reference #: [id number]

: 1990

Sex: Female



Demographics





                          Address                   [address] [place]  [address]

 

                          Home Phone                [phone]

 

                          Preferred Language        English

 

                          Marital Status            

 

                          Worship Affiliation     [id number]

 

                          Race                      White

 

                          Ethnic Group              Not  or 





Author





                          Author                    [facility]

 

                          Organization              [facility]

 

                          Address                   Unknown

 

                          Phone                     Unavailable







Support





                Name            Relationship    Address         Phone

 

                    Sohma Daniel     ECON                [address] [place]  [address]                      [phone]







Care Team Providers





                    Care Team Member Name Role                Phone

 

                    Tito Junior MD    PCP                 [phone]







Encounter Details





                          Care Team                 Description



                     Date                Type                Department  

 

                                        



Salazar Leos MD



[address] [facility]



[address]



[facility] [place], [place] [address] [phone] (Work)



[phone] (Fax)                       



                     2021          Documentation       [facility]

c  



                                         Specialists  



                                         [address] [facility]  



                                         [address]  



                                         [facility] [place] [address]  



                                         [phone]  







Social History





                                        Date



                 Tobacco Use     Types           Packs/Day       Years Used 

 

                                         



                                         Never Smoker    

 

    



                                         Smokeless Tobacco: Never   



                                         Used   







                                        Comments



                           Alcohol Use               Standard Drinks/Week 

 

                                        social



                           Yes                       0 (1 standard drink = 0.6 o

z pure alcohol) 







  



                     Alcohol Habits      Answer              Date Recorded

 

  



                           How often do you have a drink containing alcohol?  No

t asked 

 

  



                           How many drinks containing alcohol do you have on  No

t asked 



                                         a typical day when you are drinking?  

 

  



                           How often do you have six or more drinks on one  Not 

asked 



                                         occasion?  

 

  



                     Comment:            social              2020







 



                           Sex Assigned at Birth     Date Recorded

 

 



                                         Not on file 



documented as of this encounter



Plan of Treatment





                          Care Team                 Description



                     Date                Type                Specialty  

 

                                        



Salazar Leos MD



[address] [facility]



[address]



[place] [address]



[phone] (Work)



[phone] (Fax)                       



                     2022          Office Visit        Orthopedic Surgery 

 



documented as of this encounter



Visit Diagnoses

Not on filedocumented in this encounter



Care Teams





                          Start Date                End Date



                     Team Member         Relationship        Specialty  

 

                          10/24/17                   



                     Tito Junior MD   PCP - General       Family  



                           [phone] (Work)       Medicine  



                                         [phone] (Fax)    



documented as of this encounter

## 2022-01-12 NOTE — XMS REPORT
Encounter Summary

                             Created on: 2022



Vane Daniel

External Reference #: [id number]

: 1990

Sex: Female



Demographics





                          Address                   [address] [place]  [address]

 

                          Home Phone                [phone]

 

                          Preferred Language        English

 

                          Marital Status            

 

                          Latter-day Affiliation     [id number]

 

                          Race                      White

 

                          Ethnic Group              Not  or 





Author





                          Author                    [facility]

 

                          Organization              [facility]

 

                          Address                   Unknown

 

                          Phone                     Unavailable







Support





                Name            Relationship    Address         Phone

 

                    Soham Daniel     ECON                [address] [place] [address]  [address]                      [phone]







Care Team Providers





                    Care Team Member Name Role                Phone

 

                    Tito Junior MD    PCP                 [phone]







Reason for Visit

* 



  



                     Reason              Onset Date          Comments

 

  



                           Medication Refill         2022 









Encounter Details





                          Care Team                 Description



                     Date                Type                Department  

 

                                        



Jen King PA-C



[address] [facility]



[address]



[place] [address] [phone] (Work)



[phone] (Fax)                      Medication Refill



                     2022          Refill              [facility]  



                                         Specialists  



                                         [address] [facility]  



                                         [address]  



                                         [place] [address] [phone]  







Social History





                                        Date



                 Tobacco Use     Types           Packs/Day       Years Used 

 

                                         



                                         Never Smoker    

 

    



                                         Smokeless Tobacco: Never   



                                         Used   







                                        Comments



                           Alcohol Use               Standard Drinks/Week 

 

                                        social



                           Yes                       0 (1 standard drink = 0.6 o

z pure alcohol) 







  



                     Alcohol Habits      Answer              Date Recorded

 

  



                           How often do you have a drink containing alcohol?  No

t asked 

 

  



                           How many drinks containing alcohol do you have on  No

t asked 



                                         a typical day when you are drinking?  

 

  



                           How often do you have six or more drinks on one  Not 

asked 



                                         occasion?  

 

  



                     Comment:            social              2020







 



                           Sex Assigned at Birth     Date Recorded

 

 



                                         Not on file 



documented as of this encounter



Miscellaneous Notes

* Telephone Encounter - Jen King PA-C - 2022 12:42 PM CST



Formatting of this note might be different from the original.

Requested refill today at appointment.



Electronically signed by Jen King PA-C at 2022 12:42 PM CST

documented in this encounter



Plan of Treatment





                          Care Team                 Description



                     Date                Type                Specialty  

 

                                        



Salazar Leos MD



[address] [facility]



[address]



[place] [address] [phone] (Work)



[phone] (Fax)                       



                     2022          Office Visit        Orthopedic Surgery 

 



documented as of this encounter



Visit Diagnoses









                                         Diagnosis

 





                                         Post-op pain



                                         Other acute postoperative pain



documented in this encounter



Care Teams





                          Start Date                End Date



                     Team Member         Relationship        Specialty  

 

                          10/24/17                   



                     Tito Junior MD   PCP - General       Family  



                           [phone] (Work)       Medicine  



                                         [phone] (Fax)    



documented as of this encounter

## 2022-01-12 NOTE — ED GENERAL
General


Chief Complaint:  Dizziness/Syncope


Stated Complaint:  SYNCOPE


Nursing Triage Note:  


Pt reports having a syncopal episode in the bathroom and hit left side of head. 


Pt reports she broke out in hives and had abd pain prior; seen in ED a few days 


ago. Pt denies c-spine tenderness. A&O x 4. Took unknown amount of children's 


Benadryl.


Source of Information:  Patient, EMS, Family, Old Records


Exam Limitations:  No Limitations





History of Present Illness


Date Seen by Provider:  Jan 12, 2022


Time Seen by Provider:  02:22


Initial Comments


This 31-year-old young lady presents to the emergency room with recurrent 

episodes of hives accompanied by abdominal discomfort, shortness of breath, 

nausea, diarrhea, and lightheadedness.  She woke with symptoms around 0030.  

Symptoms began with itching and hives.  She then became lightheaded and had a 

syncopal episode in the bathroom.  She struck her forehead on some object.  She 

has never had a syncopal episode with these events in the past but has been 

previously lightheaded.  She reports these episodes seem to be getting worse wit

h each occurrence.  She was seen in this ER on September 9.  The focus during 

that visit was on the GI symptoms.  Symptoms seem to improve fairly rapidly with

antihistamine and antacid therapies.  She has not been able to identify a 

particular trigger.  She has pending referrals to gastroenterology and an 

allergy specialist.  She took "a couple swigs" of liquid Benadryl at home before

EMS picked her up.





Allergies and Home Medications


Allergies


Coded Allergies:  


     amoxicillin (Verified  Allergy, Unknown, 1/9/22)


     cefaclor (Verified  Allergy, Unknown, 1/9/22)


     chlorothiazide (Verified  Allergy, Unknown, 1/9/22)


     clavulanic acid (Verified  Allergy, Unknown, 1/9/22)


     sulfamethoxazole (Verified  Allergy, Unknown, 1/9/22)


     trimethoprim (Verified  Allergy, Unknown, 1/9/22)





Patient Home Medication List


Home Medication List Reviewed:  Yes


Dicyclomine HCl (Dicyclomine HCl) 20 Mg Tablet, 20 MG PO Q6H PRN for abdominal 

cramping/pain


   Prescribed by: CURTIS VÁSQUEZ on 1/9/22 0619


Epinephrine (Epipen 2-Joesph) 0.3 Mg/0.3 Ml Auto.injct, 0.3 MG IJ UD


   Prescribed by: MALIK LOUIS on 1/12/22 0602


Famotidine (Pepcid) 20 Mg Tablet, 20 MG PO BID


   Prescribed by: MALIK LOUIS on 1/12/22 0602


Hyoscyamine Sulfate (Levsin-Sl) 0.125 Mg Tab.subl, 1-2 TAB SL Q4H PRN for CRAMPS


   Prescribed by: MALIK LOUIS on 1/12/22 0625





Review of Systems


Review of Systems


Constitutional:  weakness


EENTM:  see HPI


Respiratory:  see HPI


Cardiovascular:  see HPI


Gastrointestinal:  see HPI


Genitourinary:  no symptoms reported


Pregnant:  No


LMP:  Jan 6, 2022


Musculoskeletal:  no symptoms reported


Skin:  see HPI


Psychiatric/Neurological:  No Symptoms Reported


Hematologic/Lymphatic:  No Symptoms Reported


Immunological/Allergic:  see HPI





Past Medical-Social-Family Hx


Patient Social History


Tobacco Use?:  No


Use of E-Cig and/or Vaping dev:  No


Substance use?:  No


Alcohol Use?:  No


Pt feels they are or have been:  No





Immunizations Up To Date


Influenza Vaccine Up-to-Date:  No; Not Current


First/Initial COVID19 Vaccinat:  denies





Past Medical History


Surgeries:  Yes


Orthopedic (Right foot)


Cardiac:  No


Pregnant:  No


Last Menstrual Period:  Jan 7, 2022


Genitourinary:  No


Gastrointestinal:  No


Musculoskeletal:  No


Endocrine:  Yes (Hyperglycemia)


Hypothyroidsim


HEENT:  No


Cancer:  No


Psychosocial:  Yes


Integumentary:  Yes (Hives)





Physical Exam


Vital Signs





Vital Signs - First Documented








 1/12/22





 02:25


 


Temp 36.0


 


Pulse 111


 


Resp 18


 


B/P (MAP) 113/83 (93)


 


Pulse Ox 99


 


O2 Delivery Room Air





Capillary Refill : Less Than 3 Seconds


Height, Weight, BMI


Height: '"


Weight: lbs. oz. kg; 58.00 BMI


Method:


General Appearance:  WD/WN, Mild Distress, Obese


HEENT:  PERRL/EOMI, Pharynx Normal, Other (Mucous membranes somewhat dry.  

Facial edema and erythema noted.  Mild swelling and tenderness of the left 

forehead from trauma.)


Neck:  Normal Inspection, Non Tender


Respiratory:  Lungs Clear, Normal Breath Sounds, No Accessory Muscle Use, No 

Respiratory Distress


Cardiovascular:  No Edema, No Murmur, Tachycardia (Mild)


Gastrointestinal:  Normal Bowel Sounds, Soft, Tenderness (Slight, generalized)


Extremity:  Normal Inspection, No Pedal Edema


Neurologic/Psychiatric:  Alert, Oriented x3, No Motor/Sensory Deficits, Normal 

Mood/Affect, CNs II-XII Norm as Tested


Skin:  Warm/Dry, Erythema, Rash (Generalized hives)





Progress/Results/Core Measures


Suspected Sepsis


SIRS


Temperature: 


Pulse: 111 


Respiratory Rate: 18


 


Laboratory Tests


1/12/22 02:45: White Blood Count 22.9H


Blood Pressure 113 /83 


Mean: 94


 


Laboratory Tests


1/12/22 02:45: 


Creatinine 1.20, Platelet Count 561H








Results/Orders


Lab Results





Laboratory Tests








Test


 1/12/22


02:45 1/12/22


03:27 Range/Units


 


 


White Blood Count


 22.9 H


 


 4.3-11.0


10^3/uL


 


Red Blood Count


 5.55 H


 


 3.80-5.11


10^6/uL


 


Hemoglobin 16.1 H  11.5-16.0  g/dL


 


Hematocrit 48   35-52  %


 


Mean Corpuscular Volume 87   80-99  fL


 


Mean Corpuscular Hemoglobin 29   25-34  pg


 


Mean Corpuscular Hemoglobin


Concent 34 


 


 32-36  g/dL





 


Red Cell Distribution Width 12.1   10.0-14.5  %


 


Platelet Count


 561 H


 


 130-400


10^3/uL


 


Mean Platelet Volume 9.1   9.0-12.2  fL


 


Immature Granulocyte % (Auto) 0    %


 


Neutrophils (%) (Auto) 80 H  42-75  %


 


Lymphocytes (%) (Auto) 16   12-44  %


 


Monocytes (%) (Auto) 3   0-12  %


 


Eosinophils (%) (Auto) 1   0-10  %


 


Basophils (%) (Auto) 1   0-10  %


 


Neutrophils # (Auto) 18.2 H  1.8-7.8  X 10^3


 


Lymphocytes # (Auto) 3.7   1.0-4.0  X 10^3


 


Monocytes # (Auto) 0.6   0.0-1.0  X 10^3


 


Eosinophils # (Auto)


 0.2 


 


 0.0-0.3


10^3/uL


 


Basophils # (Auto)


 0.1 


 


 0.0-0.1


10^3/uL


 


Immature Granulocyte # (Auto)


 0.1 


 


 0.0-0.1


10^3/uL


 


Neutrophils % (Manual) 70    %


 


Lymphocytes % (Manual) 9    %


 


Monocytes % (Manual) 5    %


 


Eosinophils % (Manual) 1    %


 


Band Neutrophils 6    %


 


Atypical Lymphocytes 9    %


 


Toxic Granulation 2+    


 


Platelet Estimate GIANTS SEEN    


 


Microcytosis SLIGHT    


 


Sodium Level 138   135-145  MMOL/L


 


Potassium Level 3.7   3.6-5.0  MMOL/L


 


Chloride Level 104     MMOL/L


 


Carbon Dioxide Level 17 L  21-32  MMOL/L


 


Anion Gap 17 H  5-14  MMOL/L


 


Blood Urea Nitrogen 20 H  7-18  MG/DL


 


Creatinine


 1.20 


 


 0.60-1.30


MG/DL


 


Estimat Glomerular Filtration


Rate 52 


 


  





 


BUN/Creatinine Ratio 17    


 


Glucose Level 313 H    MG/DL


 


Calcium Level 8.7   8.5-10.1  MG/DL


 


Magnesium Level 1.5 L  1.6-2.4  MG/DL


 


C-Reactive Protein 0.31   <0.50  MG/DL


 


Urine Color  Y   


 


Urine Clarity  CLEAR   


 


Urine pH  5.5  5-9  


 


Urine Specific Gravity  >=1.030  1.016-1.022  


 


Urine Protein  2+ H NEGATIVE  


 


Urine Glucose (UA)  NEGATIVE  NEGATIVE  


 


Urine Ketones  NEGATIVE  NEGATIVE  


 


Urine Nitrite  NEGATIVE  NEGATIVE  


 


Urine Bilirubin  NEGATIVE  NEGATIVE  


 


Urine Urobilinogen  0.2  < = 1.0  MG/DL


 


Urine Leukocyte Esterase  NEGATIVE  NEGATIVE  


 


Urine RBC (Auto)  2+ H NEGATIVE  


 


Urine RBC  5-10 H  /HPF


 


Urine WBC  2-5   /HPF


 


Urine Squamous Epithelial


Cells 


 0-2 


  /HPF





 


Urine Crystals  PRESENT H  /LPF


 


Urine Calcium Oxalate Crystals  FEW H  /LPF


 


Urine Bacteria  MODERATE H  /HPF


 


Urine Casts  PRESENT   /LPF


 


Urine Hyaline Casts  10-25 H  /LPF


 


Urine Mucus  MODERATE H  /LPF


 


Urine Culture Indicated  YES   








My Orders





Orders - MALIK CHAVEZ MD


Famotidine Injection (Pepcid Injection) (1/12/22 02:45)


Basic Metabolic Panel (1/12/22 02:45)


Magnesium (1/12/22 02:45)


Ua Culture If Indicated (1/12/22 02:45)


Ondansetron Injection (Zofran Injectio (1/12/22 03:00)


Crp Fs (1/12/22 02:45)


Cbc With Automated Diff (1/12/22 03:50)


Epinephrine  1 Mg Injection (Adrenalin I (1/12/22 04:00)


Urine Culture (1/12/22 03:27)


Magnesium 1 Gm/100 Ml Ivpb (Magnesium [place] (1/12/22 04:00)


Manual Differential (1/12/22 02:45)


Smear For Path Review (1/12/22 04:28)


Hyoscyamine Sl Tablet (Levsin Sl Tablet) (1/12/22 05:30)





Medications Given in ED





Current Medications








 Medications  Dose


 Ordered  Sig/Buck


 Route  Start Time


 Stop Time Status Last Admin


Dose Admin


 


 Epinephrine HCl  0.3 mg  ONCE  ONCE


 IM  1/12/22 04:00


 1/12/22 04:01 DC 1/12/22 03:58


0.3 MG


 


 Famotidine  20 mg  ONCE  ONCE


 IVP  1/12/22 02:45


 1/12/22 02:46 DC 1/12/22 02:53


20 MG


 


 Hyoscyamine


 Sulfate  0.25 mg  ONCE  ONCE


 SL  1/12/22 05:30


 1/12/22 05:31 DC 1/12/22 05:40


0.25 MG


 


 Magnesium Sulfate/


 Dextrose  100 ml @ 


 100 mls/hr  ONCE  ONCE


 IV  1/12/22 04:00


 1/12/22 05:00 DC 1/12/22 03:59


100 MLS/HR


 


 Ondansetron HCl  8 mg  ONCE  ONCE


 IVP  1/12/22 03:00


 1/12/22 03:01 DC 1/12/22 02:53


8 MG








Vital Signs/I&O











 1/12/22 1/12/22 1/12/22 1/12/22





 02:25 03:15 04:10 05:15


 


Temp 36.0   


 


Pulse 111 92 100 89


 


Resp 18 18 20 16


 


B/P (MAP) 113/83 (93) 114/84 118/73 122/89


 


Pulse Ox 99 97 95 97


 


O2 Delivery Room Air Room Air Room Air Room Air





Capillary Refill : Less Than 3 Seconds








Blood Pressure Mean:                    94








Progress Note #1:  


   Time:  04:21


Progress Note


She received a liter of IV fluid and Pepcid in addition to the Benadryl she took

at home.  Zofran was given for nausea.  Hives have improved but she is still 

having abdominal discomfort and cramping.  She has not had any further diarrhea.

 It is possible the GI component could be related to abdominal angioedema.  We 

are trialing a dose of IM epinephrine.  Hyperglycemia was noted on the last 

visit and again today.  In the absence of steroid use, this likely represents 

new diagnosis of diabetes.


Progress Note #2:  


   Time:  05:23


Progress Note


Hives, swelling, and itching have nearly resolved.  The epinephrine injection 

did not seem to have a significant impact on her symptoms.  Specifically, 

epinephrine did not resolve her abdominal discomfort or cramping.  We discussed 

using Levsin as a treatment for her bowel spasming as this may be a quick and 

efficient way to treat cramping at home if she has further episodes.  She would 

like to try a dose of Levsin here.  Pancytosis was noted on her CBC during both 

ER visits.  I have discussed this with Dr. Peres.  He and I interviewed the 

patient together to collect more information.  Given the episodic nature of 

these events and patient's history of severe allergies in her youth, it seems 

more likely that the cell count abnormalities are secondary to rapid fluid shift

from third spacing of fluid, diuresis from hyperglycemia, and dumping of fluid 

through her diarrhea, rather than a primary neoplasm or myeloproliferative 

disorder.  A peripheral smear is being sent to lab for further evaluation in 

[place] to evaluate this issue further.  Dr. Peres recommends an expedited 

referral to an allergist and focusing on treatment of allergies and anaphylaxis 

as well as logging all possible exposures within a 24-hour period of events.  

The patient and I discussed the syncopal episode at length.  Because it occurred

during intense abdominal cramping, the syncope may have been related to 

vasovagal response rather than anaphylaxis.  The exact etiology remains a 

mystery at this time.


Progress Note #3:  


   Time:  06:23


Progress Note


Vane is feeling relatively well and stable to go home.  Levsin was moderately 

helpful in reducing cramping.  A prescription will be provided.  See discharge 

instructions for more discussion.





ECG


Initial ECG Impression:  3rd Degree AV Block





Departure


Impression





   Primary Impression:  


   Urticaria


   Additional Impressions:  


   Syncope


   Qualified Codes:  R55 - Syncope and collapse


   Intestinal cramps


   Diarrhea


   Qualified Codes:  R19.7 - Diarrhea, unspecified


   Pancytosis


   Hyperglycemia


Disposition:  01 HOME, SELF-CARE


Condition:  Improved





Departure-Patient Inst.


Decision time for Depature:  06:23


Referrals:  


NO,LOCAL PHYSICIAN (PCP/Family)


Primary Care Physician


Patient Instructions:  Anaphylaxis, Syncope (Fainting), Vasovagal Response (DC),

Nocturnal (Nighttime) Leg Cramps (DC)





Add. Discharge Instructions:  


1. Hives and anaphylaxis - Continue taking Zyrtec (cetirizine) 10 mg daily. 

Additionally take Pepcid (famotidine) 20 mg twice daily. These long-acting 

antihistamines should help prevent severe episodes of hives and anaphylaxis. Ke

ep Benadryl (diphenhydramine) with you at all times. Take 50 mg at the first 

sign of hives or significant allergic reaction. The chewable or liquid forms may

be absorbed into your system faster and may be more rapidly effective. Also 

carry an EpiPen with you at all times. If you have a severe reaction that 

includes symptoms of shortness of breath, swelling of the mouth or airway, l

ightheadedness, rapid heart rate, or other severe symptoms, use the EpiPen 

immediately and present to an emergency room or call 911. It is recommended that

you have an expedited evaluation by an allergist. Please work with your primary 

care provider to expedite this referral. Keep a detailed log of foods, 

beverages, environmental exposures, products or chemicals used, medications, and

places visited in the 24 hours prior to each future episode. This will help 

identify any possible triggers of your episodes.





2. High blood sugar - Your high blood sugar may represent a new diagnosis of 

diabetes. Eat a low sugar, low carbohydrate diet. Your primary care provider may

wish to obtain a hemoglobin A1c to help determine if your high blood sugar is 

related to the episodes for which you were seen in the ER or if these high blood

sugars truly represent diabetes.





3. Syncope (passing out) - It is unclear whether your syncopal episode was due 

to a vasovagal reaction or anaphylaxis or some other cause. If you feel impendin

g syncope in the future, please get to a safe place immediately and sit or lie 

down. If this seems associated with allergic reaction or hives, take the EpiPen 

and Benadryl and get to an emergency room as soon as possible. Drink plenty of 

clear liquids to stay well-hydrated.





4. Bowel cramping and diarrhea - You may try sublingual Levsin (hyoscyamine) for

rapid relief of bowel cramping and diarrhea.





5. High cell counts (pancytosis) - A peripheral smear of your blood is being 

analyzed in the [facility] lab. Please review the results of this report

with your primary care provider when it becomes available. Depending on the res

ults, you may need consultation with a hematologist. Also work with your primary

care provider to obtain another CBC specimen when you are at least few days out 

from an acute episodes of hives and diarrhea. This specimen can be used for 

comparison of your baseline values with the values you have during an episode.





6. call with questions or concerns. Return to the ER if you have any worsening 

of symptoms.











All discharge instructions reviewed with patient and/or family. Voiced 

understanding.


Scripts


Hyoscyamine Sulfate (Levsin-Sl) 0.125 Mg Tab.subl


1-2 TAB SL Q4H PRN for CRAMPS, #10 TAB 0 Refills


   Prov: MALIK CHAVEZ MD         1/12/22 


Epinephrine (Epipen 2-Joesph) 0.3 Mg/0.3 Ml Auto.injct


0.3 MG IJ UD, #1 ML


   Prov: MALIK CHAVEZ MD         1/12/22 


Famotidine (Pepcid) 20 Mg Tablet


20 MG PO BID, #60 TAB


   Prov: MALIK CHAVEZ MD         1/12/22











MALIK CHAVEZ MD        Jan 12, 2022 04:15

## 2022-03-25 ENCOUNTER — HOSPITAL ENCOUNTER (EMERGENCY)
Dept: HOSPITAL 75 - ER | Age: 32
Discharge: HOME | End: 2022-03-25
Payer: COMMERCIAL

## 2022-03-25 VITALS — DIASTOLIC BLOOD PRESSURE: 87 MMHG | SYSTOLIC BLOOD PRESSURE: 157 MMHG

## 2022-03-25 VITALS — WEIGHT: 238.1 LBS | HEIGHT: 64.02 IN | BODY MASS INDEX: 40.65 KG/M2

## 2022-03-25 DIAGNOSIS — T78.40XA: Primary | ICD-10-CM

## 2022-03-25 DIAGNOSIS — Z91.018: ICD-10-CM

## 2022-03-25 PROCEDURE — 99282 EMERGENCY DEPT VISIT SF MDM: CPT

## 2022-03-25 NOTE — ED GENERAL
General


Chief Complaint:  Allergic Reaction


Stated Complaint:  ALLERGIC REACTION - COUGH


Nursing Triage Note:  


PT AMB TO FT 2 W REPORTS OF POSS ANAPHYLAXIS AT APPROX 1430 WHILE EATING A 


CHICKEN TACO AND BEANS D/T ALPHA GAL ALLERGY. PT REPORTS SHE BEGAN COUGHING AND 


FEELING WEIRD AND ADMIN EPI PEN. PT TO ED W/O SYMPTOMS, HERE FOR EPI PEN 


ADMINISTRATION. PT REPORTS SHE IS POSS PREGNANT. A&OX4, DENIES PAIN.


Source of Information:  Patient


Exam Limitations:  No Limitations





History of Present Illness


Date Seen by Provider:  Mar 25, 2022


Time Seen by Provider:  15:39


Initial Comments


To ER by private vehicle with reports of a episode of shortness of breath, 

coughing, rhinorrhea.  This began shortly after eating some chicken tacos.  

History of alpha gal syndrome.  She is on daily Pepcid, nightly Unisom, daily 

antihistamine and she has prednisone available at home and epinephrine at home. 

She did give herself an epinephrine injection intramuscularly at the onset of 

the symptoms with very quick resolution of the symptoms shortly thereafter.  At 

this time she is asymptomatic.  That was about 230 when this happened.


Timing/Duration:  1-3 Hours


Severity:  Moderate


Associated Systoms:  Denies Symptoms





Allergies and Home Medications


Allergies


Coded Allergies:  


     amoxicillin (Verified  Allergy, Unknown, 1/9/22)


     cefaclor (Verified  Allergy, Unknown, 1/9/22)


     chlorothiazide (Verified  Allergy, Unknown, 1/9/22)


     clavulanic acid (Verified  Allergy, Unknown, 1/9/22)


     sulfamethoxazole (Verified  Allergy, Unknown, 1/9/22)


     trimethoprim (Verified  Allergy, Unknown, 1/9/22)





Patient Home Medication List


Home Medication List Reviewed:  Yes


Dicyclomine HCl (Dicyclomine HCl) 20 Mg Tablet, 20 MG PO Q6H PRN for abdominal 

cramping/pain


   Prescribed by: CURTIS VÁSQUEZ on 1/9/22 0619


Epinephrine (Epipen 2-Joesph) 0.3 Mg/0.3 Ml Auto.injct, 0.3 MG IJ UD


   Prescribed by: MALIK LOUIS on 1/12/22 0602


Famotidine (Pepcid) 20 Mg Tablet, 20 MG PO BID


   Prescribed by: MALIK LOUIS on 1/12/22 0602


Hyoscyamine Sulfate (Levsin-Sl) 0.125 Mg Tab.subl, 1-2 TAB SL Q4H PRN for CRAMPS


   Prescribed by: MALIK LOUIS on 1/12/22 0625





Review of Systems


Review of Systems


Constitutional:  see HPI


EENTM:  see HPI, nose congestion


Respiratory:  see HPI, cough


Cardiovascular:  no symptoms reported


Genitourinary:  no symptoms reported


Musculoskeletal:  no symptoms reported


Skin:  no symptoms reported


Psychiatric/Neurological:  No Symptoms Reported


Hematologic/Lymphatic:  No Symptoms Reported





Past Medical-Social-Family Hx


Patient Social History


Tobacco Use?:  No


Use of E-Cig and/or Vaping dev:  No


Substance use?:  No


Alcohol Use?:  No





Immunizations Up To Date


Influenza Vaccine Up-to-Date:  No; Not Current


First/Initial COVID19 Vaccinat:  denies


Second COVID19 Vaccination Tarun:  denies


Third COVID19 Vaccination Date:  denies





Past Medical History


Surgery/Hospitalization HX:  


ALPHA GAL


Surgeries:  Yes


Orthopedic


Cardiac:  No


Genitourinary:  No


Gastrointestinal:  No


Musculoskeletal:  No


Endocrine:  Yes (Hyperglycemia)


Hypothyroidsim


HEENT:  No


Cancer:  No


Psychosocial:  Yes


Integumentary:  Yes (Hives)





Physical Exam


Vital Signs





Vital Signs - First Documented








 3/25/22





 15:13


 


Temp 36.7


 


Pulse 90


 


Resp 20


 


B/P (MAP) 157/87 (110)


 


Pulse Ox 99


 


O2 Delivery Room Air





Capillary Refill : Less Than 3 Seconds


Height, Weight, BMI


Height: '"


Weight: lbs. oz. kg; 40.00 BMI


Method:


General Appearance:  No Apparent Distress, WD/WN, Other (Alert and oriented very

pleasant.  Hemodynamically stable.  She is without symptoms at this point.)


Eyes:  Bilateral Eye Normal Inspection, Bilateral Eye PERRL, Bilateral Eye EOMI


HEENT:  PERRL/EOMI, TMs Normal


Neck:  Full Range of Motion, Normal Inspection


Respiratory:  Normal Breath Sounds, No Accessory Muscle Use, No Respiratory 

Distress


Cardiovascular:  Regular Rate, Rhythm, Normal Peripheral Pulses


Gastrointestinal:  Normal Bowel Sounds, Non Tender, Soft


Extremity:  Normal Capillary Refill, Normal Inspection


Neurologic/Psychiatric:  Alert, Oriented x3


Skin:  Normal Color, Warm/Dry





Progress/Results/Core Measures


Suspected Sepsis


SIRS


Temperature: 


Pulse: 90 


Respiratory Rate: 20


 


Blood Pressure 157 /87 


Mean: 110





Results/Orders


My Orders





Orders - HUGO LONG


Urine Pregnancy Bedside (3/25/22 15:34)





Vital Signs/I&O











 3/25/22





 15:13


 


Temp 36.7


 


Pulse 90


 


Resp 20


 


B/P (MAP) 157/87 (110)


 


Pulse Ox 99


 


O2 Delivery Room Air





Capillary Refill : Less Than 3 Seconds








Blood Pressure Mean:                    110











Departure


Communication (Admissions)


1408-I did discuss with her the possibility of a biphasic reaction and the 

second peak of this occurring within a few hours.  I offered to watch her here 

in the emergency room for a couple of hours though she seems to have a good 

handle on her allergic reaction symptoms, has adequate supply of medications at 

home including additional epinephrine pens and prednisone as well as 

antihistamines.  She is asymptomatic now.  She prefer to go on home and that 

seems reasonable given her demonstrated ability to manage this.





Impression





   Primary Impression:  


   History of alpha gal allergy


   Additional Impression:  


   Allergic reaction


Disposition:  01 HOME, SELF-CARE


Condition:  Stable





Departure-Patient Inst.


Decision time for Depature:  15:43


Referrals:  


NO,LOCAL PHYSICIAN (PCP/Family)


Primary Care Physician


Patient Instructions:  Food Allergy





Add. Discharge Instructions:  


1.  Return to ER for any concerns or worsening symptoms.  Continue to manage 

symptoms just as you have done today





All discharge instructions reviewed with patient and/or family. Voiced unders

andrew.











HUGO LONG             Mar 25, 2022 15:43